# Patient Record
Sex: FEMALE | Race: WHITE | NOT HISPANIC OR LATINO | URBAN - METROPOLITAN AREA
[De-identification: names, ages, dates, MRNs, and addresses within clinical notes are randomized per-mention and may not be internally consistent; named-entity substitution may affect disease eponyms.]

---

## 2021-12-13 ENCOUNTER — INPATIENT (INPATIENT)
Facility: HOSPITAL | Age: 86
LOS: 2 days | Discharge: HOME | DRG: 69 | End: 2021-12-16
Attending: INTERNAL MEDICINE | Admitting: INTERNAL MEDICINE
Payer: MEDICARE

## 2021-12-13 VITALS
HEIGHT: 61 IN | OXYGEN SATURATION: 98 % | HEART RATE: 87 BPM | DIASTOLIC BLOOD PRESSURE: 28 MMHG | RESPIRATION RATE: 18 BRPM | TEMPERATURE: 98 F | SYSTOLIC BLOOD PRESSURE: 65 MMHG

## 2021-12-13 DIAGNOSIS — R09.89 OTHER SPECIFIED SYMPTOMS AND SIGNS INVOLVING THE CIRCULATORY AND RESPIRATORY SYSTEMS: ICD-10-CM

## 2021-12-13 DIAGNOSIS — Z98.890 OTHER SPECIFIED POSTPROCEDURAL STATES: Chronic | ICD-10-CM

## 2021-12-13 DIAGNOSIS — Z90.89 ACQUIRED ABSENCE OF OTHER ORGANS: Chronic | ICD-10-CM

## 2021-12-13 DIAGNOSIS — E87.1 HYPO-OSMOLALITY AND HYPONATREMIA: ICD-10-CM

## 2021-12-13 DIAGNOSIS — H33.051 TOTAL RETINAL DETACHMENT, RIGHT EYE: Chronic | ICD-10-CM

## 2021-12-13 DIAGNOSIS — Z95.810 PRESENCE OF AUTOMATIC (IMPLANTABLE) CARDIAC DEFIBRILLATOR: Chronic | ICD-10-CM

## 2021-12-13 LAB
ALBUMIN SERPL ELPH-MCNC: 3.7 G/DL — SIGNIFICANT CHANGE UP (ref 3.3–5)
ALP SERPL-CCNC: 66 U/L — SIGNIFICANT CHANGE UP (ref 40–120)
ALT FLD-CCNC: 27 U/L — SIGNIFICANT CHANGE UP (ref 10–45)
ANION GAP SERPL CALC-SCNC: 12 MMOL/L — SIGNIFICANT CHANGE UP (ref 5–17)
APTT BLD: 31.7 SEC — SIGNIFICANT CHANGE UP (ref 27.5–35.5)
AST SERPL-CCNC: 40 U/L — SIGNIFICANT CHANGE UP (ref 10–40)
BASOPHILS # BLD AUTO: 0.01 K/UL — SIGNIFICANT CHANGE UP (ref 0–0.2)
BASOPHILS NFR BLD AUTO: 0.1 % — SIGNIFICANT CHANGE UP (ref 0–2)
BILIRUB SERPL-MCNC: 0.6 MG/DL — SIGNIFICANT CHANGE UP (ref 0.2–1.2)
BUN SERPL-MCNC: 26 MG/DL — HIGH (ref 7–23)
CALCIUM SERPL-MCNC: 9 MG/DL — SIGNIFICANT CHANGE UP (ref 8.4–10.5)
CHLORIDE SERPL-SCNC: 92 MMOL/L — LOW (ref 96–108)
CO2 SERPL-SCNC: 25 MMOL/L — SIGNIFICANT CHANGE UP (ref 22–31)
CREAT SERPL-MCNC: 1.17 MG/DL — SIGNIFICANT CHANGE UP (ref 0.5–1.3)
EOSINOPHIL # BLD AUTO: 0.1 K/UL — SIGNIFICANT CHANGE UP (ref 0–0.5)
EOSINOPHIL NFR BLD AUTO: 1 % — SIGNIFICANT CHANGE UP (ref 0–6)
GLUCOSE SERPL-MCNC: 164 MG/DL — HIGH (ref 70–99)
HCT VFR BLD CALC: 33.9 % — LOW (ref 34.5–45)
HGB BLD-MCNC: 11.3 G/DL — LOW (ref 11.5–15.5)
IMM GRANULOCYTES NFR BLD AUTO: 0.5 % — SIGNIFICANT CHANGE UP (ref 0–1.5)
INR BLD: 2.19 RATIO — HIGH (ref 0.88–1.16)
LYMPHOCYTES # BLD AUTO: 1.6 K/UL — SIGNIFICANT CHANGE UP (ref 1–3.3)
LYMPHOCYTES # BLD AUTO: 16.4 % — SIGNIFICANT CHANGE UP (ref 13–44)
MCHC RBC-ENTMCNC: 31.6 PG — SIGNIFICANT CHANGE UP (ref 27–34)
MCHC RBC-ENTMCNC: 33.3 GM/DL — SIGNIFICANT CHANGE UP (ref 32–36)
MCV RBC AUTO: 94.7 FL — SIGNIFICANT CHANGE UP (ref 80–100)
MONOCYTES # BLD AUTO: 0.55 K/UL — SIGNIFICANT CHANGE UP (ref 0–0.9)
MONOCYTES NFR BLD AUTO: 5.6 % — SIGNIFICANT CHANGE UP (ref 2–14)
NEUTROPHILS # BLD AUTO: 7.44 K/UL — HIGH (ref 1.8–7.4)
NEUTROPHILS NFR BLD AUTO: 76.4 % — SIGNIFICANT CHANGE UP (ref 43–77)
NRBC # BLD: 0 /100 WBCS — SIGNIFICANT CHANGE UP (ref 0–0)
PLATELET # BLD AUTO: 165 K/UL — SIGNIFICANT CHANGE UP (ref 150–400)
POTASSIUM SERPL-MCNC: 3.9 MMOL/L — SIGNIFICANT CHANGE UP (ref 3.5–5.3)
POTASSIUM SERPL-SCNC: 3.9 MMOL/L — SIGNIFICANT CHANGE UP (ref 3.5–5.3)
PROT SERPL-MCNC: 6.2 G/DL — SIGNIFICANT CHANGE UP (ref 6–8.3)
PROTHROM AB SERPL-ACNC: 25.3 SEC — HIGH (ref 10.6–13.6)
RBC # BLD: 3.58 M/UL — LOW (ref 3.8–5.2)
RBC # FLD: 13.7 % — SIGNIFICANT CHANGE UP (ref 10.3–14.5)
SODIUM SERPL-SCNC: 129 MMOL/L — LOW (ref 135–145)
TROPONIN T, HIGH SENSITIVITY RESULT: 24 NG/L — SIGNIFICANT CHANGE UP (ref 0–51)
TROPONIN T, HIGH SENSITIVITY RESULT: 27 NG/L — SIGNIFICANT CHANGE UP (ref 0–51)
WBC # BLD: 9.75 K/UL — SIGNIFICANT CHANGE UP (ref 3.8–10.5)
WBC # FLD AUTO: 9.75 K/UL — SIGNIFICANT CHANGE UP (ref 3.8–10.5)

## 2021-12-13 PROCEDURE — 99223 1ST HOSP IP/OBS HIGH 75: CPT

## 2021-12-13 PROCEDURE — 70498 CT ANGIOGRAPHY NECK: CPT | Mod: 26,MA

## 2021-12-13 PROCEDURE — 0042T: CPT

## 2021-12-13 PROCEDURE — 71045 X-RAY EXAM CHEST 1 VIEW: CPT | Mod: 26

## 2021-12-13 PROCEDURE — 70496 CT ANGIOGRAPHY HEAD: CPT | Mod: 26,MA

## 2021-12-13 PROCEDURE — 99291 CRITICAL CARE FIRST HOUR: CPT | Mod: GC

## 2021-12-13 PROCEDURE — 70450 CT HEAD/BRAIN W/O DYE: CPT | Mod: 26,59,MA

## 2021-12-13 RX ORDER — SODIUM CHLORIDE 9 MG/ML
1000 INJECTION INTRAMUSCULAR; INTRAVENOUS; SUBCUTANEOUS
Refills: 0 | Status: DISCONTINUED | OUTPATIENT
Start: 2021-12-13 | End: 2021-12-15

## 2021-12-13 RX ORDER — SODIUM CHLORIDE 9 MG/ML
1000 INJECTION INTRAMUSCULAR; INTRAVENOUS; SUBCUTANEOUS ONCE
Refills: 0 | Status: COMPLETED | OUTPATIENT
Start: 2021-12-13 | End: 2021-12-13

## 2021-12-13 RX ADMIN — SODIUM CHLORIDE 1000 MILLILITER(S): 9 INJECTION INTRAMUSCULAR; INTRAVENOUS; SUBCUTANEOUS at 23:45

## 2021-12-13 RX ADMIN — SODIUM CHLORIDE 1000 MILLILITER(S): 9 INJECTION INTRAMUSCULAR; INTRAVENOUS; SUBCUTANEOUS at 22:04

## 2021-12-13 NOTE — H&P ADULT - PROBLEM SELECTOR PLAN 3
hypoNa 129 - possibly 2/2 poor PO vs 2/2 chlorthalidone effect vs SIADH  c/w IVF for now   check urine lytes, osm, serum osm   recheck BMP in AM

## 2021-12-13 NOTE — ED PROVIDER NOTE - NS ED ROS FT
Review of Systems    Constitutional: (-) fever, (-) chills, (-) fatigue  Cardiovascular: (-) chest pain, (-) syncope  Respiratory: (-) cough, (-) shortness of breath  Gastrointestinal: (-) vomiting, (-) diarrhea, (-) abdominal pain  Musculoskeletal: (-) neck pain, (-) back pain, (-) joint pain  Integumentary: (-) rash, (-) edema, (+) wound  Neurological: (-) headache, (+) altered mental status    Except as documented in the HPI, all other systems are negative.

## 2021-12-13 NOTE — H&P ADULT - NSHPPHYSICALEXAM_GEN_ALL_CORE
Vital Signs Last 24 Hrs  T(C): 36.6 (13 Dec 2021 22:05), Max: 36.6 (13 Dec 2021 20:55)  T(F): 97.8 (13 Dec 2021 22:05), Max: 97.9 (13 Dec 2021 20:55)  HR: 74 (13 Dec 2021 23:27) (74 - 89)  BP: 91/50 (13 Dec 2021 23:27) (65/28 - 100/50)  BP(mean): 62 (13 Dec 2021 23:27) (62 - 65)  RR: 14 (13 Dec 2021 23:27) (14 - 20)  SpO2: 96% (13 Dec 2021 23:27) (96% - 100%) Vital Signs Last 24 Hrs  T(C): 36.6 (13 Dec 2021 22:05), Max: 36.6 (13 Dec 2021 20:55)  T(F): 97.8 (13 Dec 2021 22:05), Max: 97.9 (13 Dec 2021 20:55)  HR: 74 (13 Dec 2021 23:27) (74 - 89)  BP: 91/50 (13 Dec 2021 23:27) (65/28 - 100/50)  BP(mean): 62 (13 Dec 2021 23:27) (62 - 65)  RR: 14 (13 Dec 2021 23:27) (14 - 20)  SpO2: 96% (13 Dec 2021 23:27) (96% - 100%)    PHYSICAL EXAM:  GENERAL: NAD, well-developed  HEAD:  laceration on top of her head; normocephalic  EYES: EOMI, conjunctiva and sclera clear  NECK: Supple, no JVD  CHEST/LUNG: Clear to auscultation bilaterally; no wheezing or rales  HEART: Regular rate and rhythm; no murmurs  ABDOMEN: Soft, nontender, nondistended; bowel sounds present  EXTREMITIES:  2+ Peripheral Pulses, no edema  PSYCH: calm affect, not anxious  NEUROLOGY:  AAOx3, 5/5 strength in all extremities, +L facial droop, EOMI, tongue midline, CN V intact,    SKIN: No rashes or lesions  MUSCULOSKELETAL: no back pain, moving all extremities

## 2021-12-13 NOTE — ED PROVIDER NOTE - NSICDXFAMILYHX_GEN_ALL_CORE_FT
FAMILY HISTORY:  Father  Still living? Unknown  Family history of leukemia, Age at diagnosis: Age Unknown    Sibling  Still living? Unknown  Family history of bladder cancer, Age at diagnosis: Age Unknown

## 2021-12-13 NOTE — ED PROVIDER NOTE - PHYSICAL EXAMINATION
CONSTITUTIONAL: non-toxic, well appearing  SKIN: no rash, no petechiae.  EYES: PERRL, EOMI, pink conjunctiva, anicteric  ENT: tongue and uvular midline, no exudates, moist mucous membranes  NECK: Supple; no meningismus, no JVD  CARD: RRR, no murmurs, equal radial pulses bilaterally 2+  RESP: CTAB, no respiratory distress  ABD: Soft, non-tender, non-distended, no peritoneal signs  EXT: Normal ROM x4. No edema. No calf tenderness  NEURO: Alert, oriented. Left sided facial droop, neuro exam otherwise nonfocal. Equal strength bilateral upper and lower extremities. Sensation grossly intact.   PSYCH: Cooperative, appropriate.

## 2021-12-13 NOTE — ED PROVIDER NOTE - NSICDXPASTSURGICALHX_GEN_ALL_CORE_FT
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

## 2021-12-13 NOTE — ED PROVIDER NOTE - NSICDXPASTMEDICALHX_GEN_ALL_CORE_FT
PAST MEDICAL HISTORY:  Afib     HLD (hyperlipidemia)     [place] (hard of hearing)     HTN (hypertension)

## 2021-12-13 NOTE — ED ADULT TRIAGE NOTE - CHIEF COMPLAINT QUOTE
as per pt's son "she had a fall while with PT at home- seen and discharged from [facility]. Was at her baseline until eating dinner at 1930 when food just started falling out of her left side of mouth and she was incoherent"  + left sided facial droop noted

## 2021-12-13 NOTE — H&P ADULT - PROBLEM SELECTOR PLAN 1
pt p/w new onset L facial droop c/f CVA. CTH brain/perfusions/CTA H+N all without e/o of infract or bleed. On exam, still with facial asymmetry, rest of exam is non focal, strength otherwise appears intact. ?ischemia in setting of hypotension vs embolic in setting of known Afib vs ?metabolic/infectious in nature.  - neuro following, recs appreciated  - keep SBP up to 160   - repeat 24hr CT head; will need further information regarding cardiac device before proceeding with MRI   - can c/w home dose xarelto   - monitor on tele   - check lipid, A1c, TSH   - c/w IVF for hypoNa and holding chlorthalidone - repeat BMP in AM   - pt with no fevers, normal WBC, no infectious symptoms - CXR showing ?RLL opacity, low suspicion for PNA given no resp symptoms; awaiting UA/COVID - defer abx for now   - neuro checks q4h   - PT/OT, dysphagia screen

## 2021-12-13 NOTE — H&P ADULT - PROBLEM SELECTOR PLAN 4
currently in rate controlled Afib  holding coreg 2/2 hypotension   per neuro, can c/w xarelto for AC   monitor on tele

## 2021-12-13 NOTE — CONSULT NOTE ADULT - ASSESSMENT
Patient is a 89yo Rt handed F with pmh of Afib on xarelto, AICD, HTn, HLD presents to [facility] for Left droop and generalized weakness. LWK 1930 pm when patient was having dinner with family and noticed facial droop with drooling and generalized weakness. LWK 1930 pm with Left facial droop. Patient not a candidate for TPA nor MT candidate. Patient was reexamined after CT scanner and per son, facial droop improved. Patient was noted to be hypotensive with SBPs in the 60s.       Impression  Left facial droop with generalized weakness now improving. 2/2 to Right brain dysfunction. Etiology ischemic. . Patient noted to be hypotensive of unknown etiology with episodes of feeling weak.   Recommendation:   Keep SBP up to 160   Repeat CT scan for any acute neuro deficits given recent head trauma, other repeat Ct head in 24hrs   Can continue Xarelto home medication as Ct head was negative for bleed.   Telemetry   EKG   infectious panel   Hyponatremia management per primary team   U/A   Brain MRI w/o contrast   Patient can follow up with Dr. Nissenbaum upon discharge at [address], [address]. [place]. [address]. [phone].       Case discussed with Stroke Fellow, Dr. Lopez. Case to be seen with Dr. Cruz.      Patient is a 89yo Rt handed F with pmh of Afib on xarelto, AICD, HTn, HLD presents to [facility] for Left droop and generalized weakness. LWK 1930 pm when patient was having dinner with family and noticed facial droop with drooling and generalized weakness. LWK 1930 pm with Left facial droop. Patient not a candidate for TPA nor MT candidate. Patient was reexamined after CT scanner and per son, facial droop improved. Patient was noted to be hypotensive with SBPs in the 60s.       Impression  Left facial droop with generalized weakness now improving. 2/2 to Right brain dysfunction. Etiology ischemic. . Patient noted to be hypotensive of unknown etiology with episodes of feeling weak.   Recommendation:   Keep SBP up to 160   Repeat CT scan for any acute neuro deficits given recent head trauma, other repeat Ct head in 24hrs   Can continue Xarelto home medication as Ct head was negative for bleed.   Telemetry   Lipid panel, tsh, hgb a1c   EKG   infectious panel   Hyponatremia management per primary team   urine electrolytes   U/A   Brain MRI w/o contrast   Patient can follow up with Dr. Nissenbaum upon discharge at [address], [address]. [place]. [address]. [phone].       Case discussed with Stroke Fellow, Dr. Lopez. Case to be seen with Dr. Cruz.

## 2021-12-13 NOTE — H&P ADULT - NSHPREVIEWOFSYSTEMS_GEN_ALL_CORE
REVIEW OF SYSTEMS:    CONSTITUTIONAL: No fevers, chills  EYES/ENT: No visual changes;  no throat pain   NECK: No pain or stiffness  RESPIRATORY: No cough, no shortness of breath  CARDIOVASCULAR: No chest pain or palpitations  GASTROINTESTINAL: no nausea, vomiting, no abdominal pain, no BRBPR  GENITOURINARY: no polyuria, no dysuria  NEUROLOGICAL: no numbness, no headaches  MUSCULOSKELETAL: no back pain, no focal weakness   SKIN: No itching, burning, rashes, or lesions; +head laceration   PSYCH: no anxiety, depression  HEME: no gum bleeding, no bruising

## 2021-12-13 NOTE — H&P ADULT - NSICDXPASTMEDICALHX_GEN_ALL_CORE_FT
PAST MEDICAL HISTORY:  Afib     HLD (hyperlipidemia)     [place] (hard of hearing)     HTN (hypertension)

## 2021-12-13 NOTE — H&P ADULT - ASSESSMENT
89F with PMH of Afib on xarelto, HTN, HLD, s/p AICD, [place] p/w L facial droop after mechanical fall earlier; a/f stroke work-up, hyponatremia.

## 2021-12-13 NOTE — ED PROVIDER NOTE - CLINICAL SUMMARY MEDICAL DECISION MAKING FREE TEXT BOX
Kathryn-PGY3: 89 year old female with PMH atrial fibrillation on Xarelto, HTN, HLD presents with left sided facial weakness since 1930 today. Per son, pt had mechanical fall this afternoon where she tripped and hit her head, denies LOC. Pt seen at [facility] after wall with CT showing no intracranial hemorrhage per son, had scalp laceration stapled, and discharged. Per son, pt was sitting at dinner table and started speaking incoherently with left sided facial droop. Pt code stroke in triage, also noted to be hypotensive. Pt BP typically low per son. Will obtain labs, imaging, neuro recs, anticipate likely admission for further evaluation and management.

## 2021-12-13 NOTE — ED PROVIDER NOTE - OBJECTIVE STATEMENT
89 year old female with PMH atrial fibrillation on Xarelto, HTN, HLD presents with left sided facial weakness since 1930 today. Per son, pt had mechanical fall this afternoon where she tripped and hit her head, denies LOC. Pt seen at [facility] after wall with CT showing no intracranial hemorrhage per son, had scalp laceration stapled, and discharged. Per son, pt was sitting at dinner table    TO BE COMPLETED 89 year old female with PMH atrial fibrillation on Xarelto, HTN, HLD presents with left sided facial weakness since 1930 today. Per son, pt had mechanical fall this afternoon where she tripped and hit her head, denies LOC. Pt seen at [facility] after wall with CT showing no intracranial hemorrhage per son, had scalp laceration stapled, and discharged. Per son, pt was sitting at dinner table and started speaking incoherently with left sided facial droop. Denies any fevers, chest pain, shortness of breath, abdominal pain, vomiting, diarrhea, dysuria, headache, vision change, numbness, or rash. Denies any additional complaints.

## 2021-12-13 NOTE — ED ADULT NURSE NOTE - NSIMPLEMENTINTERV_GEN_ALL_ED
Implemented All Fall with Harm Risk Interventions:  [place] to call system. Call bell, personal items and telephone within reach. Instruct patient to call for assistance. Room bathroom lighting operational. Non-slip footwear when patient is off stretcher. Physically safe environment: no spills, clutter or unnecessary equipment. Stretcher in lowest position, wheels locked, appropriate side rails in place. Provide visual cue, wrist band, yellow gown, etc. Monitor gait and stability. Monitor for mental status changes and reorient to person, place, and time. Review medications for side effects contributing to fall risk. Reinforce activity limits and safety measures with patient and family. Provide visual clues: red socks.

## 2021-12-13 NOTE — H&P ADULT - HISTORY OF PRESENT ILLNESS
89F with PMH of Afib on xarelto, HTN, HLD, s/p AICD, [place] p/w L facial droop. History from patient and charts. Per pt, she had a fall earlier today - she was working with PT earlier when she lost her balance and fell backward and hit her head on the wall; denies any LOC but endorses significant bleeding from a head laceration. She denies any prodromal lightheadedness, dizziness, CP, SOB, palpitations. She states she has balance issues at baseline with frequent falls, last fall 2 weeks ago. EMS was called and pt taken to [facility], where she had CT head showing no bleed and had her laceration stapled and sent home. Pt states she was feeling well until right after dinner, when she started feeling dizzy and has poor recollection of events thereafter.   Called and unable to reach son Patrick Wheeler at listed number - further history from charts. Per charts, son started noticing L facial droop around 1930 this evening, with assocated drooling and noted food was dropping out of her mouth and incoherent speech. Pt did not lose consciousness and son did not note any seizure like activity. Brought to the ED as code stroke. Pt herself denies any headache, blurry vision, change in speech pattern, difficulty swallowing, no focal weakness, no numbness/tingling.  Denies any infectious symptoms including fevers, chills, cough, SOB, n/v, abd pain, diarrhea, dysuria or polyuria.     In ED, noted ot be hypotensive on arrival; had CTH imaging showing no acute bleed or infarcts. Per neuro notes, son states L facial droop is improving.

## 2021-12-13 NOTE — ED PROVIDER NOTE - ATTENDING CONTRIBUTION TO CARE
Evaluating for critical conditions noted above. Ordering tests. Reviewing results. Ordering medications as noted in MAR. Discussions with consultant.   Code stroke cancelled after determining that pt had been on xarelto and INR high -> ineligible for TPA.    Dr. TRICIA Wong:  I have personally evaluated the patient and have documented above as appropriate. I perfomed a substantive portion of the visit including all aspects of the medical decision making.

## 2021-12-13 NOTE — CONSULT NOTE ADULT - ATTENDING COMMENTS
code stroke called in ED and neuro emergently assessed patient.   Briefly   89yo Rt handed F with  Afib on xarelto, AICD, HTn, HLD presents to [facility] for facial droop and generalized weakness. LWK 1930 pm when patient was having dinner with family and noticed facial droop with drooling and generalized weakness. LWK 1930 pm with Left facial droop. Patient not a candidate for TPA nor MT candidate. Patient was reexamined after CT scanner and per son, facial droop improved. Patient was noted to be hypotensive with SBPs in the 60s.   CTH chronic L BG lacunar infarct   CTA with question of FMD vs short segment stenosis     Impression  R facial droop with generalized weakness now improving. 2/2 to L brain dysfunction from old stroke . Etiology ischemic, . Patient noted to be hypotensive 60/30 in ED. now improved     Recommendation:   - c/w DOAC for AF  - statin therpay for secondary stroke prevention. LDL goal <70mg/dL.  - Hemoglobin A1c and lipid panel  - TTE not needed. AICD interrogation   - telemetry  - BP normotensive   - check FS, glucose control <180  - GI/DVT ppx  - Counseling on diet, exercise, and medication adherence was done  - Counseling on smoking cessation and alcohol consumption offered when appropriate.  - Pain assessed and judicious use of narcotics when appropriate was discussed.    - Stroke education given when appropriate.  - Importance of fall prevention discussed.   - Differential diagnosis and plan of care discussed with patient and/or family and primary team  - Thank you for allowing me to participate in the care of this patient. Call with questions.

## 2021-12-13 NOTE — ED ADULT NURSE NOTE - OBJECTIVE STATEMENT
89 y/o female presenting to ED ambulatory, A&Ox3, presenting with  dizziness and facial drooping. As per patient family, primary historian, pt had a witnessed mechanical fall while working with PT,  hitting head at 1130 and was sent to [facility] for workup. Pt received CT scan negative for bleed and received sutures for head laceration. Pt now complains of dizziness and left side facial drooping. as per pt son, pt was eating dinner and facial drooping started, food was coming out of patients mouth. pt denies loss of consciousness, headache, changes in vision, shortness of breath, chest pain, fevers, chills, cough, n/v/d, palpitations. 89 y/o female presenting to ED ambulatory, A&Ox3, presenting with  dizziness and facial drooping. As per patient family, primary historian, pt had a witnessed mechanical fall while working with PT,  hitting head at 1130 and was sent to [facility] for workup. Pt received CT scan negative for bleed and received sutures for head laceration. Pt now complains of dizziness and left side facial drooping. pt on anticoagulation. as per pt son, pt was eating dinner and facial drooping started, food was coming out of patients mouth. pt denies loss of consciousness, headache, changes in vision, shortness of breath, chest pain, fevers, chills, cough, n/v/d, palpitations, diaphoresis, urinary symptoms. 2mm pupils PERRL. extremities equal in strength and sensation. hard of hearing at baseline. left side facial drooping present. no drift in extremities. steady gait observed. bleeding controlled, head staples present on top of head. breathing spontanous and unlabored. abd soft nontender. pt placed on cardiac monitor showing NSR with pacemaker. code stroke called. Safety and comfort measures provided, bed locked and in lowest position, side rails up for safety. Call bell within reach. Awaiting results

## 2021-12-13 NOTE — H&P ADULT - NSHPLABSRESULTS_GEN_ALL_CORE
Labs, imaging and EKG personally reviewed and interpreted by me.                           11.3   9.75  )-----------( 165      ( 13 Dec 2021 21:14 )             33.9     12-13    129<L>  |  92<L>  |  26<H>  ----------------------------<  164<H>  3.9   |  25  |  1.17    Ca    9.0      13 Dec 2021 21:14    TPro  6.2  /  Alb  3.7  /  TBili  0.6  /  DBili  x   /  AST  40  /  ALT  27  /  AlkPhos  66  12-13        PT/INR - ( 13 Dec 2021 21:14 )   PT: 25.3 sec;   INR: 2.19 ratio         PTT - ( 13 Dec 2021 21:14 )  PTT:31.7 sec      < from: CT Brain Stroke Protocol (12.13.21 @ 21:22) >      IMPRESSION:    No acute intracranial hemorrhage, mass effect, orevidence of acute   vascular territorial infarction. If clinical symptoms persist or worsen,   more sensitive evaluation with brain MRI may be obtained, if no   contraindications exist.    < end of copied text > Labs, imaging and EKG personally reviewed and interpreted by me.   labs notable for normal WBC, HB; hypoNa 129, Cr 1.16 (unknown baseline), normal LFTs, delta trop (-).  Imaging personally reviewed and interpreted by me - CXR: RLL patchy opacity, no effusions   EKG personally reviewed and interpreted by me - Aflutter with variable block, ?pacing, iRBBB, no acute KAMI,                           11.3   9.75  )-----------( 165      ( 13 Dec 2021 21:14 )             33.9     12-13    129<L>  |  92<L>  |  26<H>  ----------------------------<  164<H>  3.9   |  25  |  1.17    Ca    9.0      13 Dec 2021 21:14    TPro  6.2  /  Alb  3.7  /  TBili  0.6  /  DBili  x   /  AST  40  /  ALT  27  /  AlkPhos  66  12-13        PT/INR - ( 13 Dec 2021 21:14 )   PT: 25.3 sec;   INR: 2.19 ratio         PTT - ( 13 Dec 2021 21:14 )  PTT:31.7 sec      < from: CT Brain Stroke Protocol (12.13.21 @ 21:22) >      IMPRESSION:    No acute intracranial hemorrhage, mass effect, orevidence of acute   vascular territorial infarction. If clinical symptoms persist or worsen,   more sensitive evaluation with brain MRI may be obtained, if no   contraindications exist.    < end of copied text >

## 2021-12-13 NOTE — CONSULT NOTE ADULT - SUBJECTIVE AND OBJECTIVE BOX
MRN-[id number]  Patient is a 89y old  Female who presents with a chief complaint of   HPI:  Patient is a 89yo Rt handed F with pmh of Afib on xarelto, AICD, HTn, HLD presents to [facility] for Left droop and generalized weakness. LWK 1930 pm when patient was having dinner with family and noticed facial droop with drooling and generalized weakness. Code stroke was called 2058pm. Patient was noted to have mild Left facial droop. Per history from family, patient was noted to have head trauma with lacerations and staples. Earlier today, Physical therapy arrived at the house and when patient saw Physical therapist, patient stumbled back and hit head against the wall. There was no LOC noted. Patient is followed by her cardiologist Dr. Uriostegui and Neurologist Dr. Nissenbaum for ataxia. Patient's son states patient has been feeling weak lately. Patient had NIHSS 1 and MRS 4. Patient not TPA candidate due to recent head trauma with low NIHSS scale. Patient not MT candidate due to no LVO.     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      FAMILY HISTORY:  Family history of leukemia (Father)    Family history of bladder cancer (Sibling)      Social Hx:  Nonsmoker, no drug or alcohol use    Home Medications:  amiodarone 200 mg oral tablet: 1 tab(s) orally once a day (27 Dec 2016 15:15)  calcium carbonate:  orally once a day (27 Dec 2016 15:15)  carvedilol 25 mg oral tablet: 1 tab(s) orally 2 times a day (27 Dec 2016 15:15)  Centrum Silver Women&#x27;s: 1 tab(s) orally once a day (27 Dec 2016 15:15)  folic acid 1 mg oral tablet: 1 tab(s) orally once a day (27 Dec 2016 15:15)  Lasix 20 mg oral tablet: 1 tab(s) orally 2 times a day (27 Dec 2016 15:15)  pravastatin 40 mg oral tablet: 1 tab(s) orally once a day (27 Dec 2016 15:15)  Protegra oral tablet: 1 tab(s) orally once a day (27 Dec 2016 15:15)  Vitamin B6 100 mg oral tablet: 1 tab(s) orally once a day (27 Dec 2016 15:15)  Xarelto 15 mg oral tablet: 1 tab(s) orally once a day (in the evening) (27 Dec 2016 15:15)    MEDICATIONS  (STANDING):  sodium chloride 0.9% Bolus 1000 milliLiter(s) IV Bolus once    MEDICATIONS  (PRN):    Allergies  No Known Allergies    Intolerances      REVIEW OF SYSTEMS  General: Feels weak 		  Respiratory and Thorax:	Denies sob   Cardiovascular:	Denies chest pain   Gastrointestinal:	Denies N, V   Genitourinary:	Denies urinary incontinence   Musculoskeletal:	Denies muscle pain   Neurological: Denies headaches, numbness and tinglng.   	    ROS: Pertinent positives in HPI, all other ROS were reviewed and are negative.      Vital Signs Last 24 Hrs  T(C): 36.6 (13 Dec 2021 20:55), Max: 36.6 (13 Dec 2021 20:55)  T(F): 97.9 (13 Dec 2021 20:55), Max: 97.9 (13 Dec 2021 20:55)  HR: 77 (13 Dec 2021 21:32) (77 - 89)  BP: 100/50 (13 Dec 2021 21:32) (65/28 - 100/50)  BP(mean): 65 (13 Dec 2021 21:32) (65 - 65)  RR: 14 (13 Dec 2021 21:32) (14 - 20)  SpO2: 100% (13 Dec 2021 21:32) (98% - 100%)    GENERAL EXAM:  Constitutional: awake and alert. NAD  HEENT: PERRL, EOMI  Neck: Supple  Vascular: no carotid bruits  Musculoskeletal: no joint swelling/tenderness, no abnormal movements  Skin: no rashes    NEUROLOGICAL EXAM:  MS: AAOX3, speech is fluent, Follows commands. There is no aphasia noted.   CN: VFF, EOMI, PERRL,   V1-3 intact, very mild left facial palsy, t/p midline  Motor: Strength: 5/5 4x.   Tone: normal. Bulk: normal.   Plantar flex b/l.   Sensation: intact to LT/Temperature 4x.   Coordination: FTN intact b/l.   Gait:  Deferred     NIHSS 1  mRS 4    Labs:   cbc                      11.3   9.75  )-----------( 165      ( 13 Dec 2021 21:14 )             33.9     [id number]    129<L>  |  92<L>  |  26<H>  ----------------------------<  164<H>  3.9   |  25  |  1.17    Ca    9.0      13 Dec 2021 21:14    TPro  6.2  /  Alb  3.7  /  TBili  0.6  /  DBili  x   /  AST  40  /  ALT  27  /  AlkPhos  66  12-13    CoagsPT/INR - ( 13 Dec 2021 21:14 )   PT: 25.3 sec;   INR: 2.19 ratio         PTT - ( 13 Dec 2021 21:14 )  PTT:31.7 sec    LFTsLIVER FUNCTIONS - ( 13 Dec 2021 21:14 )  Alb: 3.7 g/dL / Pro: 6.2 g/dL / ALK PHOS: 66 U/L / ALT: 27 U/L / AST: 40 U/L / GGT: x             Radiology:  IMPRESSION:  No acute intracranial hemorrhage, mass effect, orevidence of acute   vascular territorial infarction. If clinical symptoms persist or worsen,   more sensitive evaluation with brain MRI may be obtained, if no   contraindications exist.    CTA Neck: No significant flow-limiting stenosis or evidence of acute   dissection within the cervical carotid or vertebral arteries. No specific   short segment beaded appearance to the mid left cervical internal carotid   artery for which focal fibromuscular dysplasia is not excluded.    Partially imaged 2.6 cm right paratracheal peripherally calcified lesion.   Dedicated nonemergent chest imaging may be obtained asclinically   warranted.    CTA Head: No proximal large vessel occlusion.    CT Perfusion: No perfusion abnormality.

## 2021-12-13 NOTE — H&P ADULT - PROBLEM SELECTOR PLAN 2
pt hypotensive on arrival 60/30 --> now improved to 90/50s  pt on multiple antiHTN at home including coreg, chlorthalidone, ramipril, ?lasix   c/w IVF overnight   holding all home antiHTN  q4h vitals for now

## 2021-12-13 NOTE — ED ADULT NURSE NOTE - CHIEF COMPLAINT QUOTE
as per pt's son "she had a fall while with PT at home- seen and discharged from [facility]. Was at her baseline until eating dinner at 1930 when food just started falling out of her left side of mouth and she was incoherent"  + left sided facial droop noted

## 2021-12-14 DIAGNOSIS — E87.1 HYPO-OSMOLALITY AND HYPONATREMIA: ICD-10-CM

## 2021-12-14 DIAGNOSIS — I95.9 HYPOTENSION, UNSPECIFIED: ICD-10-CM

## 2021-12-14 DIAGNOSIS — Z29.9 ENCOUNTER FOR PROPHYLACTIC MEASURES, UNSPECIFIED: ICD-10-CM

## 2021-12-14 DIAGNOSIS — I63.9 CEREBRAL INFARCTION, UNSPECIFIED: ICD-10-CM

## 2021-12-14 DIAGNOSIS — I48.0 PAROXYSMAL ATRIAL FIBRILLATION: ICD-10-CM

## 2021-12-14 LAB
ANION GAP SERPL CALC-SCNC: 11 MMOL/L — SIGNIFICANT CHANGE UP (ref 5–17)
APPEARANCE UR: CLEAR — SIGNIFICANT CHANGE UP
BACTERIA # UR AUTO: NEGATIVE — SIGNIFICANT CHANGE UP
BASOPHILS # BLD AUTO: 0 K/UL — SIGNIFICANT CHANGE UP (ref 0–0.2)
BASOPHILS NFR BLD AUTO: 0 % — SIGNIFICANT CHANGE UP (ref 0–2)
BILIRUB UR-MCNC: NEGATIVE — SIGNIFICANT CHANGE UP
BUN SERPL-MCNC: 20 MG/DL — SIGNIFICANT CHANGE UP (ref 7–23)
CALCIUM SERPL-MCNC: 8.9 MG/DL — SIGNIFICANT CHANGE UP (ref 8.4–10.5)
CHLORIDE SERPL-SCNC: 100 MMOL/L — SIGNIFICANT CHANGE UP (ref 96–108)
CHOLEST SERPL-MCNC: 117 MG/DL — SIGNIFICANT CHANGE UP
CO2 SERPL-SCNC: 25 MMOL/L — SIGNIFICANT CHANGE UP (ref 22–31)
COLOR SPEC: YELLOW — SIGNIFICANT CHANGE UP
CREAT ?TM UR-MCNC: 30 MG/DL — SIGNIFICANT CHANGE UP
CREAT SERPL-MCNC: 1.01 MG/DL — SIGNIFICANT CHANGE UP (ref 0.5–1.3)
DIFF PNL FLD: NEGATIVE — SIGNIFICANT CHANGE UP
EOSINOPHIL # BLD AUTO: 0.08 K/UL — SIGNIFICANT CHANGE UP (ref 0–0.5)
EOSINOPHIL NFR BLD AUTO: 1.4 % — SIGNIFICANT CHANGE UP (ref 0–6)
EPI CELLS # UR: 0 /HPF — SIGNIFICANT CHANGE UP
GLUCOSE SERPL-MCNC: 91 MG/DL — SIGNIFICANT CHANGE UP (ref 70–99)
GLUCOSE UR QL: NEGATIVE — SIGNIFICANT CHANGE UP
HCT VFR BLD CALC: 30.3 % — LOW (ref 34.5–45)
HDLC SERPL-MCNC: 66 MG/DL — SIGNIFICANT CHANGE UP
HGB BLD-MCNC: 10 G/DL — LOW (ref 11.5–15.5)
IMM GRANULOCYTES NFR BLD AUTO: 0.2 % — SIGNIFICANT CHANGE UP (ref 0–1.5)
KETONES UR-MCNC: NEGATIVE — SIGNIFICANT CHANGE UP
LEUKOCYTE ESTERASE UR-ACNC: NEGATIVE — SIGNIFICANT CHANGE UP
LIPID PNL WITH DIRECT LDL SERPL: 40 MG/DL — SIGNIFICANT CHANGE UP
LYMPHOCYTES # BLD AUTO: 1.58 K/UL — SIGNIFICANT CHANGE UP (ref 1–3.3)
LYMPHOCYTES # BLD AUTO: 27.2 % — SIGNIFICANT CHANGE UP (ref 13–44)
MAGNESIUM SERPL-MCNC: 2 MG/DL — SIGNIFICANT CHANGE UP (ref 1.6–2.6)
MCHC RBC-ENTMCNC: 31.3 PG — SIGNIFICANT CHANGE UP (ref 27–34)
MCHC RBC-ENTMCNC: 33 GM/DL — SIGNIFICANT CHANGE UP (ref 32–36)
MCV RBC AUTO: 95 FL — SIGNIFICANT CHANGE UP (ref 80–100)
MONOCYTES # BLD AUTO: 0.57 K/UL — SIGNIFICANT CHANGE UP (ref 0–0.9)
MONOCYTES NFR BLD AUTO: 9.8 % — SIGNIFICANT CHANGE UP (ref 2–14)
NEUTROPHILS # BLD AUTO: 3.57 K/UL — SIGNIFICANT CHANGE UP (ref 1.8–7.4)
NEUTROPHILS NFR BLD AUTO: 61.4 % — SIGNIFICANT CHANGE UP (ref 43–77)
NITRITE UR-MCNC: NEGATIVE — SIGNIFICANT CHANGE UP
NON HDL CHOLESTEROL: 51 MG/DL — SIGNIFICANT CHANGE UP
NRBC # BLD: 0 /100 WBCS — SIGNIFICANT CHANGE UP (ref 0–0)
OSMOLALITY SERPL: 285 MOSMOL/KG — SIGNIFICANT CHANGE UP (ref 280–301)
OSMOLALITY UR: 372 MOS/KG — SIGNIFICANT CHANGE UP (ref 300–900)
PH UR: 7 — SIGNIFICANT CHANGE UP (ref 5–8)
PHOSPHATE SERPL-MCNC: 3.1 MG/DL — SIGNIFICANT CHANGE UP (ref 2.5–4.5)
PLATELET # BLD AUTO: 144 K/UL — LOW (ref 150–400)
POTASSIUM SERPL-MCNC: 3.3 MMOL/L — LOW (ref 3.5–5.3)
POTASSIUM SERPL-SCNC: 3.3 MMOL/L — LOW (ref 3.5–5.3)
PROT UR-MCNC: SIGNIFICANT CHANGE UP
RBC # BLD: 3.19 M/UL — LOW (ref 3.8–5.2)
RBC # FLD: 13.9 % — SIGNIFICANT CHANGE UP (ref 10.3–14.5)
RBC CASTS # UR COMP ASSIST: 0 /HPF — SIGNIFICANT CHANGE UP (ref 0–4)
SARS-COV-2 RNA SPEC QL NAA+PROBE: SIGNIFICANT CHANGE UP
SODIUM SERPL-SCNC: 136 MMOL/L — SIGNIFICANT CHANGE UP (ref 135–145)
SODIUM UR-SCNC: 44 MMOL/L — SIGNIFICANT CHANGE UP
SP GR SPEC: 1.05 — HIGH (ref 1.01–1.02)
TRIGL SERPL-MCNC: 54 MG/DL — SIGNIFICANT CHANGE UP
TSH SERPL-MCNC: 2.11 UIU/ML — SIGNIFICANT CHANGE UP (ref 0.27–4.2)
UROBILINOGEN FLD QL: NEGATIVE — SIGNIFICANT CHANGE UP
WBC # BLD: 5.81 K/UL — SIGNIFICANT CHANGE UP (ref 3.8–10.5)
WBC # FLD AUTO: 5.81 K/UL — SIGNIFICANT CHANGE UP (ref 3.8–10.5)
WBC UR QL: 1 /HPF — SIGNIFICANT CHANGE UP (ref 0–5)

## 2021-12-14 PROCEDURE — 70450 CT HEAD/BRAIN W/O DYE: CPT | Mod: 26

## 2021-12-14 PROCEDURE — 93010 ELECTROCARDIOGRAM REPORT: CPT

## 2021-12-14 RX ORDER — RIVAROXABAN 15 MG-20MG
15 KIT ORAL
Refills: 0 | Status: DISCONTINUED | OUTPATIENT
Start: 2021-12-14 | End: 2021-12-16

## 2021-12-14 RX ORDER — FOLIC ACID 0.8 MG
1 TABLET ORAL DAILY
Refills: 0 | Status: DISCONTINUED | OUTPATIENT
Start: 2021-12-14 | End: 2021-12-16

## 2021-12-14 RX ORDER — ACETAMINOPHEN 500 MG
650 TABLET ORAL EVERY 6 HOURS
Refills: 0 | Status: DISCONTINUED | OUTPATIENT
Start: 2021-12-14 | End: 2021-12-16

## 2021-12-14 RX ORDER — ONDANSETRON 8 MG/1
4 TABLET, FILM COATED ORAL EVERY 8 HOURS
Refills: 0 | Status: DISCONTINUED | OUTPATIENT
Start: 2021-12-14 | End: 2021-12-14

## 2021-12-14 RX ORDER — AMIODARONE HYDROCHLORIDE 400 MG/1
200 TABLET ORAL DAILY
Refills: 0 | Status: DISCONTINUED | OUTPATIENT
Start: 2021-12-14 | End: 2021-12-16

## 2021-12-14 RX ORDER — ATORVASTATIN CALCIUM 80 MG/1
10 TABLET, FILM COATED ORAL AT BEDTIME
Refills: 0 | Status: DISCONTINUED | OUTPATIENT
Start: 2021-12-14 | End: 2021-12-16

## 2021-12-14 RX ORDER — LANOLIN ALCOHOL/MO/W.PET/CERES
3 CREAM (GRAM) TOPICAL AT BEDTIME
Refills: 0 | Status: DISCONTINUED | OUTPATIENT
Start: 2021-12-14 | End: 2021-12-16

## 2021-12-14 RX ADMIN — ATORVASTATIN CALCIUM 10 MILLIGRAM(S): 80 TABLET, FILM COATED ORAL at 21:40

## 2021-12-14 RX ADMIN — Medication 1 MILLIGRAM(S): at 17:56

## 2021-12-14 RX ADMIN — SODIUM CHLORIDE 100 MILLILITER(S): 9 INJECTION INTRAMUSCULAR; INTRAVENOUS; SUBCUTANEOUS at 01:39

## 2021-12-14 RX ADMIN — AMIODARONE HYDROCHLORIDE 200 MILLIGRAM(S): 400 TABLET ORAL at 05:39

## 2021-12-14 RX ADMIN — SODIUM CHLORIDE 60 MILLILITER(S): 9 INJECTION INTRAMUSCULAR; INTRAVENOUS; SUBCUTANEOUS at 13:18

## 2021-12-14 RX ADMIN — RIVAROXABAN 15 MILLIGRAM(S): KIT at 17:56

## 2021-12-14 NOTE — PROGRESS NOTE ADULT - PROBLEM SELECTOR PLAN 1
pt p/w new onset L facial droop c/f CVA. CTH brain/perfusions/CTA H+N all without e/o of infract or bleed. On exam, still with facial asymmetry, rest of exam is non focal, strength otherwise appears intact. ?ischemia in setting of hypotension vs embolic in setting of known Afib vs ?metabolic/infectious in nature.  - neuro following, recs appreciated  - keep SBP up to 160   - repeat 24hr CT head; will need further information regarding cardiac device before proceeding with MRI   - can c/w home dose xarelto   - monitor on tele   - check lipid, A1c, TSH   - c/w IVF for hypoNa and holding chlorthalidone - repeat BMP in AM   - pt with no fevers, normal WBC, no infectious symptoms - CXR showing ?RLL opacity, low suspicion for PNA given no resp symptoms; awaiting UA/COVID - defer abx for now   - neuro checks q4h   - PT/OT, dysphagia screen pt p/w new onset L facial droop c/f CVA. CTH brain/perfusions/CTA H+N all without e/o of infract or bleed. On exam, still with facial asymmetry, rest of exam is non focal, strength otherwise appears intact. ?ischemia in setting of hypotension vs embolic in setting of known Afib vs ?metabolic/infectious in nature.     - repeat 24hr CT head; will need further information regarding cardiac device before proceeding with MRI   - can c/w home dose xarelto 15 mg/day  - monitor on tele   - check lipid, A1c, TSH   - c/w IVF for hypoNa and holding chlorthalidone - repeat BMP in AM   - pt with no fevers, normal WBC, no infectious symptoms - CXR showing ?RLL opacity, low suspicion for PNA given no resp symptoms; awaiting UA/COVID - defer abx for now   - PT eval, Swallow cleared for diet today.

## 2021-12-14 NOTE — PHYSICAL THERAPY INITIAL EVALUATION ADULT - DISCHARGE DISPOSITION, PT EVAL
subacute. If pt goes home, recommend home PT and assist for functional activities/rehabilitation facility

## 2021-12-14 NOTE — PROGRESS NOTE ADULT - SUBJECTIVE AND OBJECTIVE BOX
INTERVAL HPI/OVERNIGHT EVENTS:  Pt seen and examined at bedside.     Allergies/Intolerance: No Known Allergies      MEDICATIONS  (STANDING):  aMIOdarone    Tablet 200 milliGRAM(s) Oral daily  atorvastatin 10 milliGRAM(s) Oral at bedtime  folic acid 1 milliGRAM(s) Oral daily  rivaroxaban 15 milliGRAM(s) Oral with dinner  sodium chloride 0.9%. 1000 milliLiter(s) (100 mL/Hr) IV Continuous <Continuous>    MEDICATIONS  (PRN):  acetaminophen     Tablet .. 650 milliGRAM(s) Oral every 6 hours PRN Temp greater or equal to 38C (100.4F), Mild Pain (1 - 3)  aluminum hydroxide/magnesium hydroxide/simethicone Suspension 30 milliLiter(s) Oral every 4 hours PRN Dyspepsia  melatonin 3 milliGRAM(s) Oral at bedtime PRN Insomnia  ondansetron Injectable 4 milliGRAM(s) IV Push every 8 hours PRN Nausea and/or Vomiting        ROS: all systems reviewed and wnl      PHYSICAL EXAMINATION:  Vital Signs Last 24 Hrs  T(C): 36.3 (14 Dec 2021 07:40), Max: 36.7 (14 Dec 2021 02:11)  T(F): 97.4 (14 Dec 2021 07:40), Max: 98.1 (14 Dec 2021 02:11)  HR: 80 (14 Dec 2021 07:40) (72 - 92)  BP: 105/66 (14 Dec 2021 07:40) (65/28 - 107/76)  BP(mean): 65 (14 Dec 2021 00:09) (62 - 65)  RR: 16 (14 Dec 2021 07:40) (13 - 20)  SpO2: 98% (14 Dec 2021 07:40) (94% - 100%)  CAPILLARY BLOOD GLUCOSE  165 (13 Dec 2021 21:09)      POCT Blood Glucose.: 165 mg/dL (13 Dec 2021 20:59)        GENERAL:   NECK: supple, No JVD  CHEST/LUNG: clear to auscultation bilaterally; no rales, rhonchi, or wheezing b/l  HEART: normal S1, S2  ABDOMEN: BS+, soft, ND, NT   EXTREMITIES:  pulses palpable; no clubbing, cyanosis, or edema b/l LEs  SKIN: no rashes or lesions      LABS:                        10.0   5.81  )-----------( 144      ( 14 Dec 2021 05:43 )             30.3     12-14    136  |  100  |  20  ----------------------------<  91  3.3<L>   |  25  |  1.01    Ca    8.9      14 Dec 2021 05:42  Phos  3.1     12-  Mg     2.0     -    TPro  6.2  /  Alb  3.7  /  TBili  0.6  /  DBili  x   /  AST  40  /  ALT  27  /  AlkPhos  66  12-13    PT/INR - ( 13 Dec 2021 21:14 )   PT: 25.3 sec;   INR: 2.19 ratio         PTT - ( 13 Dec 2021 21:14 )  PTT:31.7 sec  Urinalysis Basic - ( 14 Dec 2021 03:46 )    Color: Yellow / Appearance: Clear / S.046 / pH: x  Gluc: x / Ketone: Negative  / Bili: Negative / Urobili: Negative   Blood: x / Protein: Trace / Nitrite: Negative   Leuk Esterase: Negative / RBC: 0 /hpf / WBC 1 /HPF   Sq Epi: x / Non Sq Epi: 0 /hpf / Bacteria: Negative             INTERVAL HPI/OVERNIGHT EVENTS:  Pt seen and examined at bedside.     Allergies/Intolerance: No Known Allergies      MEDICATIONS  (STANDING):  aMIOdarone    Tablet 200 milliGRAM(s) Oral daily  atorvastatin 10 milliGRAM(s) Oral at bedtime  folic acid 1 milliGRAM(s) Oral daily  rivaroxaban 15 milliGRAM(s) Oral with dinner  sodium chloride 0.9%. 1000 milliLiter(s) (100 mL/Hr) IV Continuous <Continuous>    MEDICATIONS  (PRN):  acetaminophen     Tablet .. 650 milliGRAM(s) Oral every 6 hours PRN Temp greater or equal to 38C (100.4F), Mild Pain (1 - 3)  aluminum hydroxide/magnesium hydroxide/simethicone Suspension 30 milliLiter(s) Oral every 4 hours PRN Dyspepsia  melatonin 3 milliGRAM(s) Oral at bedtime PRN Insomnia  ondansetron Injectable 4 milliGRAM(s) IV Push every 8 hours PRN Nausea and/or Vomiting        ROS: all systems reviewed and wnl      PHYSICAL EXAMINATION:  Vital Signs Last 24 Hrs  T(C): 36.3 (14 Dec 2021 07:40), Max: 36.7 (14 Dec 2021 02:11)  T(F): 97.4 (14 Dec 2021 07:40), Max: 98.1 (14 Dec 2021 02:11)  HR: 80 (14 Dec 2021 07:40) (72 - 92)  BP: 105/66 (14 Dec 2021 07:40) (65/28 - 107/76)  BP(mean): 65 (14 Dec 2021 00:09) (62 - 65)  RR: 16 (14 Dec 2021 07:40) (13 - 20)  SpO2: 98% (14 Dec 2021 07:40) (94% - 100%)  CAPILLARY BLOOD GLUCOSE  165 (13 Dec 2021 21:09)      POCT Blood Glucose.: 165 mg/dL (13 Dec 2021 20:59)        GENERAL: stable, seen in ER, comfortable, alert, no focal deficits  NECK: supple, No JVD  CHEST/LUNG: clear to auscultation bilaterally; no rales, rhonchi, or wheezing b/l  HEART: normal S1, S2  ABDOMEN: BS+, soft, ND, NT   EXTREMITIES:  pulses palpable; no clubbing, cyanosis, or edema b/l LEs  SKIN: no rashes or lesions      LABS:                        10.0   5.81  )-----------( 144      ( 14 Dec 2021 05:43 )             30.3     12-14    136  |  100  |  20  ----------------------------<  91  3.3<L>   |  25  |  1.01    Ca    8.9      14 Dec 2021 05:42  Phos  3.1     12-  Mg     2.0     -    TPro  6.2  /  Alb  3.7  /  TBili  0.6  /  DBili  x   /  AST  40  /  ALT  27  /  AlkPhos  66  12-13    PT/INR - ( 13 Dec 2021 21:14 )   PT: 25.3 sec;   INR: 2.19 ratio         PTT - ( 13 Dec 2021 21:14 )  PTT:31.7 sec  Urinalysis Basic - ( 14 Dec 2021 03:46 )    Color: Yellow / Appearance: Clear / S.046 / pH: x  Gluc: x / Ketone: Negative  / Bili: Negative / Urobili: Negative   Blood: x / Protein: Trace / Nitrite: Negative   Leuk Esterase: Negative / RBC: 0 /hpf / WBC 1 /HPF   Sq Epi: x / Non Sq Epi: 0 /hpf / Bacteria: Negative

## 2021-12-14 NOTE — PROGRESS NOTE ADULT - PROBLEM SELECTOR PLAN 2
pt hypotensive on arrival 60/30 --> now improved to 90/50s  pt on multiple antiHTN at home including coreg, chlorthalidone, ramipril, ?lasix   c/w IVF overnight   holding all home antiHTN  q4h vitals for now pt hypotensive on arrival 60/30 --> now improved to 90/50s  pt on multiple antiHTN at home including coreg, chlorthalidone, ramipril, ?lasix   c/w IVF, holding all home antiHTN  q4h vitals for now

## 2021-12-14 NOTE — PHYSICAL THERAPY INITIAL EVALUATION ADULT - ADDITIONAL COMMENTS
Pt lives alone in 1st floor apartment, no stairs to negotiate. Son lives nearby, stops in daily, takes pt to shopping, appointments, etc. Pt has cane, often carries it. Pt had a recent fall (prior to this one) was starting home PT 1st session on the day of the fall that brought her into the hospital. Pt takes baths independently. Son does laundry for her.

## 2021-12-14 NOTE — PROGRESS NOTE ADULT - PROBLEM SELECTOR PLAN 4
currently in rate controlled Afib  holding coreg 2/2 hypotension   per neuro, can c/w xarelto for AC   monitor on tele currently in rate controlled Afib, continue Amiodarone 200 mg/day.

## 2021-12-14 NOTE — PROGRESS NOTE ADULT - PROBLEM SELECTOR PLAN 5
c/w xarelto  fall, aspiration, seizure precautions   NPO pending dysphagia screen c/w Xarelto 15 mg/day.

## 2021-12-14 NOTE — PROGRESS NOTE ADULT - PROBLEM SELECTOR PLAN 3
hypoNa 129 - possibly 2/2 poor PO vs 2/2 chlorthalidone effect vs SIADH  c/w IVF for now   check urine lytes, osm, serum osm   recheck BMP in AM hypoNa 129 - possibly 2/2 poor PO vs 2/2 chlorthalidone effect vs SIADH  c/w IVF for now, better today at 136, follow in AM. Stop HCTZ.

## 2021-12-14 NOTE — ED ADULT NURSE REASSESSMENT NOTE - NS ED NURSE REASSESS COMMENT FT1
np jassi aware of patients hypotension, IV infusing. pt asymptomatic denies dizziness, chest pain, shortness of breath.
pt moved to [place], pt sleeping in between care. 2mm pupils pERRL. updated on plan of care.
Pt received from MARIA FERNANDA Ceja in green, alert and oriented times four, breathing spontaneous, unlabored without distress on room air, left side facial droop, slurred speech, NAD, VSS, CM paced rhythm, admitted to tele for hyponatremia/ facial droop, awaits bed

## 2021-12-14 NOTE — PHYSICAL THERAPY INITIAL EVALUATION ADULT - PERTINENT HX OF CURRENT PROBLEM, REHAB EVAL
89 y/oF admitted 12/13 p/w L facial droop. Pt had a fall when working with PT earlier in the day, lost balance and fell backward hitting head on the wall. denies any LOC but endorses significant bleeding from a head laceration. She denies any prodromal lightheadedness, dizziness, CP, SOB, palpitations. She states she has balance issues at baseline with frequent falls, last fall 2 weeks ago. EMS was called and pt taken to [facility], where she had CT head showing no bleed and had her laceration stapled 89 y/oF admitted 12/13 p/w L facial droop. Pt had a fall earlier in the day, (was opening the door for 1st session of home PT) lost balance and fell backward hitting head on the wall. denies any LOC but endorses significant bleeding from a head laceration. She denies any prodromal lightheadedness, dizziness, CP, SOB, palpitations. She states she has balance issues at baseline with frequent falls, last fall 2 weeks ago. EMS was called and pt taken to [facility],

## 2021-12-14 NOTE — PHYSICAL THERAPY INITIAL EVALUATION ADULT - PRECAUTIONS/LIMITATIONS, REHAB EVAL
and sent home. Pt states she was feeling well until right after dinner, when she started feeling dizzy and has poor recollection of events thereafter. Son started noticing facial droop at around 1930 this evening, with assocated drooling and noted food was dropping out of her mouth and incoherent speech. Pt did not lose consciousness and son did not note any seizure like activity. Brought to the ED as code stroke. CTH neg for acute bleed or infarcts. 24 hr CT and MRI pending. PMH Afib on xarelto, HTN, HLD, s/p AICD, [place] where she had CT head showing no bleed and had her laceration stapled and sent home. Pt states she was feeling well until right after dinner, when she started feeling dizzy and has poor recollection of events thereafter. Son started noticing facial droop at around 1930 this evening, with assocated drooling and noted food was dropping out of her mouth and incoherent speech. Pt did not lose consciousness and son did not note any seizure like activity. Brought to the ED as code stroke. CTH neg for acute bleed or infarcts. 24 hr CT and MRI pending. PMH Afib on xarelto, HTN, HLD, s/p AICD, [place]/fall precautions

## 2021-12-15 LAB
CULTURE RESULTS: SIGNIFICANT CHANGE UP
SPECIMEN SOURCE: SIGNIFICANT CHANGE UP
TSH SERPL-MCNC: 3.57 UIU/ML — SIGNIFICANT CHANGE UP (ref 0.27–4.2)

## 2021-12-15 PROCEDURE — 93280 PM DEVICE PROGR EVAL DUAL: CPT | Mod: 26

## 2021-12-15 RX ORDER — INFLUENZA VIRUS VACCINE 15; 15; 15; 15 UG/.5ML; UG/.5ML; UG/.5ML; UG/.5ML
0.7 SUSPENSION INTRAMUSCULAR ONCE
Refills: 0 | Status: DISCONTINUED | OUTPATIENT
Start: 2021-12-15 | End: 2021-12-16

## 2021-12-15 RX ADMIN — Medication 1 MILLIGRAM(S): at 11:18

## 2021-12-15 RX ADMIN — SODIUM CHLORIDE 60 MILLILITER(S): 9 INJECTION INTRAMUSCULAR; INTRAVENOUS; SUBCUTANEOUS at 08:42

## 2021-12-15 RX ADMIN — ATORVASTATIN CALCIUM 10 MILLIGRAM(S): 80 TABLET, FILM COATED ORAL at 21:51

## 2021-12-15 RX ADMIN — RIVAROXABAN 15 MILLIGRAM(S): KIT at 18:10

## 2021-12-15 RX ADMIN — AMIODARONE HYDROCHLORIDE 200 MILLIGRAM(S): 400 TABLET ORAL at 05:42

## 2021-12-15 NOTE — PROGRESS NOTE ADULT - PROBLEM SELECTOR PLAN 2
pt hypotensive on arrival 60/30 --> now improved to 90/50s  pt on multiple antiHTN at home including coreg, chlorthalidone, ramipril, ?lasix   c/w IVF, holding all home antiHTN  q4h vitals for now pt hypotensive on arrival 60/30 --> now improved to 90/50s  pt on multiple antiHTN at home including coreg, chlorthalidone, ramipril, ?lasix, all stopped   c/w IVF, holding all home antiHTN  q4h vitals for now, tele is paced

## 2021-12-15 NOTE — PROGRESS NOTE ADULT - PROBLEM SELECTOR PLAN 3
hypoNa 129 - possibly 2/2 poor PO vs 2/2 chlorthalidone effect vs SIADH  c/w IVF for now, better today at 136, follow in AM. Stop HCTZ.

## 2021-12-15 NOTE — PROGRESS NOTE ADULT - SUBJECTIVE AND OBJECTIVE BOX
INTERVAL HPI/OVERNIGHT EVENTS:  Pt seen and examined at bedside.     Allergies/Intolerance: No Known Allergies      MEDICATIONS  (STANDING):  aMIOdarone    Tablet 200 milliGRAM(s) Oral daily  atorvastatin 10 milliGRAM(s) Oral at bedtime  folic acid 1 milliGRAM(s) Oral daily  influenza  Vaccine (HIGH DOSE) 0.7 milliLiter(s) IntraMuscular once  rivaroxaban 15 milliGRAM(s) Oral with dinner  sodium chloride 0.9%. 1000 milliLiter(s) (60 mL/Hr) IV Continuous <Continuous>    MEDICATIONS  (PRN):  acetaminophen     Tablet .. 650 milliGRAM(s) Oral every 6 hours PRN Temp greater or equal to 38C (100.4F), Mild Pain (1 - 3)  aluminum hydroxide/magnesium hydroxide/simethicone Suspension 30 milliLiter(s) Oral every 4 hours PRN Dyspepsia  melatonin 3 milliGRAM(s) Oral at bedtime PRN Insomnia        ROS: all systems reviewed and wnl      PHYSICAL EXAMINATION:  Vital Signs Last 24 Hrs  T(C): 36.7 (15 Dec 2021 04:28), Max: 37.2 (14 Dec 2021 19:07)  T(F): 98 (15 Dec 2021 04:28), Max: 99 (14 Dec 2021 19:07)  HR: 90 (15 Dec 2021 04:28) (70 - 90)  BP: 106/67 (15 Dec 2021 04:28) (93/53 - 113/65)  BP(mean): --  RR: 18 (15 Dec 2021 04:28) (17 - 18)  SpO2: 97% (15 Dec 2021 04:28) (95% - 100%)  CAPILLARY BLOOD GLUCOSE           @ 07:  -  12-15 @ 07:00  --------------------------------------------------------  IN: 0 mL / OUT: 0 mL / NET: 0 mL    12-15 @ 07:  -  12-15 @ 10:32  --------------------------------------------------------  IN: 120 mL / OUT: 0 mL / NET: 120 mL        GENERAL:   NECK: supple, No JVD  CHEST/LUNG: clear to auscultation bilaterally; no rales, rhonchi, or wheezing b/l  HEART: normal S1, S2  ABDOMEN: BS+, soft, ND, NT   EXTREMITIES:  pulses palpable; no clubbing, cyanosis, or edema b/l LEs  SKIN: no rashes or lesions      LABS:                        10.0   5.81  )-----------( 144      ( 14 Dec 2021 05:43 )             30.3     12-14    136  |  100  |  20  ----------------------------<  91  3.3<L>   |  25  |  1.01    Ca    8.9      14 Dec 2021 05:42  Phos  3.1     12-14  Mg     2.0     12-14    TPro  6.2  /  Alb  3.7  /  TBili  0.6  /  DBili  x   /  AST  40  /  ALT  27  /  AlkPhos  66  12-13    PT/INR - ( 13 Dec 2021 21:14 )   PT: 25.3 sec;   INR: 2.19 ratio         PTT - ( 13 Dec 2021 21:14 )  PTT:31.7 sec  Urinalysis Basic - ( 14 Dec 2021 03:46 )    Color: Yellow / Appearance: Clear / S.046 / pH: x  Gluc: x / Ketone: Negative  / Bili: Negative / Urobili: Negative   Blood: x / Protein: Trace / Nitrite: Negative   Leuk Esterase: Negative / RBC: 0 /hpf / WBC 1 /HPF   Sq Epi: x / Non Sq Epi: 0 /hpf / Bacteria: Negative             INTERVAL HPI/OVERNIGHT EVENTS:  Pt seen and examined at bedside.     Allergies/Intolerance: No Known Allergies      MEDICATIONS  (STANDING):  aMIOdarone    Tablet 200 milliGRAM(s) Oral daily  atorvastatin 10 milliGRAM(s) Oral at bedtime  folic acid 1 milliGRAM(s) Oral daily  influenza  Vaccine (HIGH DOSE) 0.7 milliLiter(s) IntraMuscular once  rivaroxaban 15 milliGRAM(s) Oral with dinner  sodium chloride 0.9%. 1000 milliLiter(s) (60 mL/Hr) IV Continuous <Continuous>    MEDICATIONS  (PRN):  acetaminophen     Tablet .. 650 milliGRAM(s) Oral every 6 hours PRN Temp greater or equal to 38C (100.4F), Mild Pain (1 - 3)  aluminum hydroxide/magnesium hydroxide/simethicone Suspension 30 milliLiter(s) Oral every 4 hours PRN Dyspepsia  melatonin 3 milliGRAM(s) Oral at bedtime PRN Insomnia        ROS: all systems reviewed and wnl      PHYSICAL EXAMINATION:  Vital Signs Last 24 Hrs  T(C): 36.7 (15 Dec 2021 04:28), Max: 37.2 (14 Dec 2021 19:07)  T(F): 98 (15 Dec 2021 04:28), Max: 99 (14 Dec 2021 19:07)  HR: 90 (15 Dec 2021 04:28) (70 - 90)  BP: 106/67 (15 Dec 2021 04:28) (93/53 - 113/65)  BP(mean): --  RR: 18 (15 Dec 2021 04:28) (17 - 18)  SpO2: 97% (15 Dec 2021 04:28) (95% - 100%)  CAPILLARY BLOOD GLUCOSE           @ 07:  -  12-15 @ 07:00  --------------------------------------------------------  IN: 0 mL / OUT: 0 mL / NET: 0 mL    12-15 @ 07:  -  12-15 @ 10:32  --------------------------------------------------------  IN: 120 mL / OUT: 0 mL / NET: 120 mL        GENERAL: stable, in bed, eager for discharge, no CP or SOB  NECK: supple, No JVD  CHEST/LUNG: clear to auscultation bilaterally; no rales, rhonchi, or wheezing b/l  HEART: normal S1, S2  ABDOMEN: BS+, soft, ND, NT   EXTREMITIES:  pulses palpable; no clubbing, cyanosis, or edema b/l LEs  SKIN: no rashes or lesions      LABS:                        10.0   5.81  )-----------( 144      ( 14 Dec 2021 05:43 )             30.3     12-14    136  |  100  |  20  ----------------------------<  91  3.3<L>   |  25  |  1.01    Ca    8.9      14 Dec 2021 05:42  Phos  3.1     -  Mg     2.0     -    TPro  6.2  /  Alb  3.7  /  TBili  0.6  /  DBili  x   /  AST  40  /  ALT  27  /  AlkPhos  66  12-13    PT/INR - ( 13 Dec 2021 21:14 )   PT: 25.3 sec;   INR: 2.19 ratio         PTT - ( 13 Dec 2021 21:14 )  PTT:31.7 sec  Urinalysis Basic - ( 14 Dec 2021 03:46 )    Color: Yellow / Appearance: Clear / S.046 / pH: x  Gluc: x / Ketone: Negative  / Bili: Negative / Urobili: Negative   Blood: x / Protein: Trace / Nitrite: Negative   Leuk Esterase: Negative / RBC: 0 /hpf / WBC 1 /HPF   Sq Epi: x / Non Sq Epi: 0 /hpf / Bacteria: Negative

## 2021-12-15 NOTE — PROGRESS NOTE ADULT - SUBJECTIVE AND OBJECTIVE BOX
Neurology Progress Note    S: Patient seen and examined. No new events overnight. patient denied CP, SOB, HA or pain.     Medication:  acetaminophen     Tablet .. 650 milliGRAM(s) Oral every 6 hours PRN  aluminum hydroxide/magnesium hydroxide/simethicone Suspension 30 milliLiter(s) Oral every 4 hours PRN  aMIOdarone    Tablet 200 milliGRAM(s) Oral daily  atorvastatin 10 milliGRAM(s) Oral at bedtime  folic acid 1 milliGRAM(s) Oral daily  influenza  Vaccine (HIGH DOSE) 0.7 milliLiter(s) IntraMuscular once  melatonin 3 milliGRAM(s) Oral at bedtime PRN  rivaroxaban 15 milliGRAM(s) Oral with dinner      Vitals:  Vital Signs Last 24 Hrs  T(C): 36.7 (15 Dec 2021 04:28), Max: 37.2 (14 Dec 2021 19:07)  T(F): 98 (15 Dec 2021 04:28), Max: 99 (14 Dec 2021 19:07)  HR: 90 (15 Dec 2021 04:28) (70 - 90)  BP: 106/67 (15 Dec 2021 04:28) (93/53 - 113/65)  BP(mean): --  RR: 18 (15 Dec 2021 04:28) (17 - 18)  SpO2: 97% (15 Dec 2021 04:28) (95% - 100%)    General Exam:   General Appearance: Appropriately dressed and in no acute distress       Head: Normocephalic, atraumatic and no dysmorphic features  Ear, Nose, and Throat: Moist mucous membranes  CVS: S1S2+  Resp: No SOB, no wheeze or rhonchi  Abd: soft NTND  Extremities: No edema, no cyanosis  Skin: No bruises, no rashes     Neurological Exam:  Mental Status: Awake, alert and oriented x 3.  Able to follow simple and complex verbal commands. Able to name and repeat. fluent speech. No obvious aphasia or dysarthria noted.   Cranial Nerves: PERRL, EOMI, VFFC, sensation V1-V3 intact,  mild facial asymmetry improving  , equal elevation of palate, scm/trap 5/5, tongue is midline on protrusion. no obvious papilledema on fundoscopic exam. Hearing is grossly intact.   Motor: Normal bulk, tone and strength throughout. Fine finger movements were intact and symmetric. no tremors or drift noted.    Sensation: Intact to light touch and pinprick throughout. no right/left confusion. no extinction to tactile on DSS.    Reflexes: 1+ throughout at biceps, brachioradialis, triceps, patellars and ankles bilaterally and equal. No clonus. R toe and L toe were both downgoing.  Coordination: No dysmetria on FNF    Gait: deferred     I personally reviewed the below data/images/labs:      CBC Full  -  ( 14 Dec 2021 05:43 )  WBC Count : 5.81 K/uL  RBC Count : 3.19 M/uL  Hemoglobin : 10.0 g/dL  Hematocrit : 30.3 %  Platelet Count - Automated : 144 K/uL  Mean Cell Volume : 95.0 fl  Mean Cell Hemoglobin : 31.3 pg  Mean Cell Hemoglobin Concentration : 33.0 gm/dL  Auto Neutrophil # : 3.57 K/uL  Auto Lymphocyte # : 1.58 K/uL  Auto Monocyte # : 0.57 K/uL  Auto Eosinophil # : 0.08 K/uL  Auto Basophil # : 0.00 K/uL  Auto Neutrophil % : 61.4 %  Auto Lymphocyte % : 27.2 %  Auto Monocyte % : 9.8 %  Auto Eosinophil % : 1.4 %  Auto Basophil % : 0.0 %    -    136  |  100  |  20  ----------------------------<  91  3.3<L>   |  25  |  1.01    Ca    8.9      14 Dec 2021 05:42  Phos  3.1     12-14  Mg     2.0     12-14    TPro  6.2  /  Alb  3.7  /  TBili  0.6  /  DBili  x   /  AST  40  /  ALT  27  /  AlkPhos  66  12-13    LIVER FUNCTIONS - ( 13 Dec 2021 21:14 )  Alb: 3.7 g/dL / Pro: 6.2 g/dL / ALK PHOS: 66 U/L / ALT: 27 U/L / AST: 40 U/L / GGT: x           PT/INR - ( 13 Dec 2021 21:14 )   PT: 25.3 sec;   INR: 2.19 ratio         PTT - ( 13 Dec 2021 21:14 )  PTT:31.7 sec  Urinalysis Basic - ( 14 Dec 2021 03:46 )    Color: Yellow / Appearance: Clear / S.046 / pH: x  Gluc: x / Ketone: Negative  / Bili: Negative / Urobili: Negative   Blood: x / Protein: Trace / Nitrite: Negative   Leuk Esterase: Negative / RBC: 0 /hpf / WBC 1 /HPF   Sq Epi: x / Non Sq Epi: 0 /hpf / Bacteria: Negative    < from: CT Angio Neck w/ IV Cont (21 @ 21:42) >    ACC: [id number] EXAM:  CT ANGIO BRAIN (W)AW IC                        ACC: [id number] EXAM:  CT ANGIO NECK (W)AW IC                        ACC: [id number] EXAM:  CT PERFUSION W MAPS IC                          PROCEDURE DATE:  2021          INTERPRETATION:  CLINICAL HISTORY: Stroke code.    TECHNIQUE:  During IV contrast administration, serial thin section CT images of the   brain were obtained for CT perfusion. The raw data was sent to [facility]   Ischemia View for post processing.  Axial CT images were then acquired through the  neck and head  during the   arterial phase.  Intravenous contrast total:  120 cc of Omnipaque-300 mg/ml were   administered; 80 cc were discarded.  Three-dimensional MIP reformats were generated.    COMPARISON STUDY: None.    FINDINGS:    CT PERFUSION:    Perfusion parameters are as follows:    CBF <30%: 0 mL  Tmax >6 seconds: 0 mL  Mismatch volume: 0 mL  Mismatch ratio: None.    CT ANGIOGRAPHY NECK:    Thoracic aorta and branch vessels: Patent. Atherosclerotic   calcifications.  No flow-limiting stenosis.  No evidence of dissection.    Right carotid system: Patent.  No atherosclerosis. No hemodynamically   significant stenosis using NASCET criteria.  No evidence of dissection.    Left carotid system: Patent.No atherosclerosis.  No hemodynamically   significant stenosis using NASCET criteria.  No evidence of dissection.   Nonspecific short segment beaded appearance to the mid left internal   carotid artery.    Vertebral arteries: Patent.  No atherosclerosis.  No flow-limiting   stenosis.  No evidence of dissection.    Soft tissues of the neck: Unremarkable.    Visualized spine: Multilevel degenerative change.    Visualized upper chest: Rounded peripherally calcified right paratracheal   structure is incompletely imaged on this examination.    CT ANGIOGRAPHY BRAIN:    Internal carotid arteries: Patent bilaterally. No flow limiting stenosis.    Anterior cerebral arteries: Patent bilaterally without flow limiting   stenosis.    Middle cerebral arteries: Patent bilaterally without flow limiting    stenosis.    Anterior communicating artery: Poorly visualized.    Posterior communicating arteries: Visualized bilaterally.    Posterior cerebral arteries: Patent bilaterally without stenosis. Right   fetal origin.    Vertebrobasilar: Patent without stenosis. The distal vertebral arteries   are similar in caliber.    Vascular lesions: No evidence of intracranial aneurysm within limits of   CTA technique.  Tiny aneurysms may be beyond the resolution of this   examination.    Dural venous sinuses: Grossly patent.    IMPRESSION:    CTA Neck: No significant flow-limiting stenosis or evidence of acute   dissection within the cervical carotid or vertebral arteries. No specific   short segment beaded appearance to the mid left cervical internal carotid   artery for which focal fibromuscular dysplasia is not excluded.    Partially imaged 2.6 cm right paratracheal peripherally calcified lesion.   Dedicated nonemergent chest imaging may be obtained asclinically   warranted.    CTA Head: No proximal large vessel occlusion.    CT Perfusion: No perfusion abnormality.    --- End of Report ---            TU CONNER MD; Attending Radiologist  This document has been electronically signed. Dec 13 2021  9:43PM    < end of copied text >  < from: CT Head No Cont (21 @ 22:34) >    ACC: [id number] EXAM:  CT BRAIN                          PROCEDURE DATE:  2021          INTERPRETATION:  HEAD CT    INDICATIONS: 89F  Afib, Left facial droop, generalized weakness and   confusion, on xarelto, HTN, HLD, s/p AICD, [place] p/w L facial droop,  after   mechanical fall earlier; a/f stroke work-up, hyponatremia  code stroke, follow-up    TECHNIQUE:    HEAD CT:    Serial axial images were obtained from the skull base to the vertex   without the use of intravenous contrast and reconstructed in sagittal and   coronal planes    COMPARISON EXAMINATION: Head CT, CT perfusion, CTA, 2021    FINDINGS:    VENTRICLES AND SULCI: Redemonstration moderate-to-marked enlargement of   the ventricles and sulci consistent with volume loss with posterior fossa   arachnoid cyst and denise cisterna magna.    INTRA-AXIAL: Unchanged white matter decreased attenuation, likely   microvascular disease with age indeterminate lacunar infarcts, as well as   pontine decreased attenuation that may reflect microvascular disease,   altered sodium metabolism. Unchanged partially empty sella. No new   intracranial mass, acute hemorrhage, or midline shift, carotid siphon   calcification, basal cisterns are patent.  EXTRA-AXIAL: No extra-axial fluid collection is present.  INTRACRANIAL HEMORRHAGE: No new intracranial hemorrhage    VISUALIZED SINUSES: No air-fluid levels are identified minimal, sinus   mucosal thickening.    MASTOIDS: Mastoid tip slight sclerosis opacification, degenerative   temporomandibular joints CALVARIUM: Demonstration right frontoparietal   scalp soft tissue swelling and scalp staples without underlying fracture.   Degenerative craniocervical junction.    MISCELLANEOUS:  Absent orbital lenses with cataract surgery    IMPRESSION:    Redemonstration volume loss, partially empty sella, microvascular   disease, age indeterminate lacunar infarcts, no new hemorrhage or midline   shift. Right frontoparietal scalp soft tissue swelling and scalp staples.   If symptoms persist consider follow-up head CT or MR if no   contraindication.          --- End of Report ---            JUVENTINO NORWOOD MD; Attending Radiologist  This document has been electronically signed. Dec 15 2021  8:39AM    < end of copied text >

## 2021-12-15 NOTE — PROVIDER CONTACT NOTE (OTHER) - SITUATION
notified by tele tech that pt. was Vpaced a fib. pt. displayed inappropriate mode switching on pacemaker

## 2021-12-15 NOTE — PATIENT PROFILE ADULT - FALL HARM RISK - HARM RISK INTERVENTIONS

## 2021-12-15 NOTE — PATIENT PROFILE ADULT - WILL THE PATIENT ACCEPT THE PFIZER COVID-19 VACCINE IF ELIGIBLE AND IT IS AVAILABLE?
Aniket Macias   10/30/2018 10:40 AM   Anticoagulation Therapy Visit    Description:  79 year old male   Provider:  [facility]   Department:  [facility]           INR as of 10/30/2018     Today's INR 3.0      Anticoagulation Summary as of 10/30/2018     INR goal 2.0-3.0   Today's INR 3.0   Full warfarin instructions 2.5 mg on Tue; 5 mg all other days   Next INR check 12/3/2018    Indications   Long-term (current) use of anticoagulants [Z79.01] [Z79.01]  Paroxysmal atrial fibrillation [I48.0]         Your next Anticoagulation Clinic appointment(s)     Dec 03, 2018  1:00 PM CST   Anticoagulation Visit with [facility]   [facility])    [address] [place] [id number] [phone] OPT 2              Contact Numbers     Anticoagulant (INR) Clinic Number: [phone]          October 2018 Details    Sun Mon Tue Wed Thu Fri Sat      1               2               3               4               5               6                 7               8               9               10               11               12               13                 14               15               16               17               18               19               20                 21               22               23               24               25               26               27                 28               29               30      2.5 mg   See details      31      5 mg             Date Details   10/30 This INR check               How to take your warfarin dose     To take:  2.5 mg Take 0.5 of a 5 mg tablet.    To take:  5 mg Take 1 of the 5 mg tablets.           November 2018 Details    Sun Mon Tue Wed Thu Fri Sat         1      5 mg         2      5 mg         3      5 mg           4      5 mg         5      5 mg         6      2.5 mg         7      5 mg         8      5 mg         9      5 mg         10      5 mg            11      5 mg         12      5 mg         13      2.5 mg         14      5 mg         15      5 mg         16      5 mg         17      5 mg           18      5 mg         19      5 mg         20      2.5 mg         21      5 mg         22      5 mg         23      5 mg         24      5 mg           25      5 mg         26      5 mg         27      2.5 mg         28      5 mg         29      5 mg         30      5 mg           Date Details   No additional details            How to take your warfarin dose     To take:  2.5 mg Take 0.5 of a 5 mg tablet.    To take:  5 mg Take 1 of the 5 mg tablets.           December 2018 Details    Sun Mon Tue Wed Thu Fri Sat           1      5 mg           2      5 mg         3            4               5               6               7               8                 9               10               11               12               13               14               15                 16               17               18               19               20               21               22                 23               24               25               26               27               28               29                 30               31                     Date Details   No additional details    Date of next INR:  12/3/2018         How to take your warfarin dose     To take:  5 mg Take 1 of the 5 mg tablets.            Not applicable

## 2021-12-15 NOTE — PROCEDURE NOTE - ADDITIONAL PROCEDURE DETAILS
Indication: intermittent Ventricular pacing noted at rate of 130bpm. Patient is intermittently tracking at upper rate limit of device.   Patient is in AFib rate 80's, ventricular pacing on demand   - patient appeared to have undersensing of p waves.  - normal ventricular sensing and pacing threshold. Stable lead impedance  - Review of stored data revealed  mode switch episodes for AFib  - changes made: atrial sensitivity reduced from 0.2 to to 0.1mv, and max track/ PMT rate reduced to 110bpm     [id number]

## 2021-12-15 NOTE — PROGRESS NOTE ADULT - PROBLEM SELECTOR PLAN 1
pt p/w new onset L facial droop c/f CVA. CTH brain/perfusions/CTA H+N all without e/o of infract or bleed. On exam, still with facial asymmetry, rest of exam is non focal, strength otherwise appears intact. ?ischemia in setting of hypotension vs embolic in setting of known Afib vs ?metabolic/infectious in nature.     - repeat 24hr CT head; will need further information regarding cardiac device before proceeding with MRI   - can c/w home dose xarelto 15 mg/day  - monitor on tele   - check lipid, A1c, TSH   - c/w IVF for hypoNa and holding chlorthalidone - repeat BMP in AM   - pt with no fevers, normal WBC, no infectious symptoms - CXR showing ?RLL opacity, low suspicion for PNA given no resp symptoms; awaiting UA/COVID - defer abx for now   - PT eval, Swallow cleared for diet today. pt p/w new onset L facial droop c/f CVA. CTH brain/perfusions/CTA H+N all without e/o of infract or bleed. On exam, still with facial asymmetry, rest of exam is non focal, strength otherwise appears intact. ?ischemia in setting of hypotension vs embolic in setting of known Afib vs ?metabolic/infectious in nature.     - can c/w home dose xarelto 15 mg/day  - monitor on tele   - check lipid, A1c, TSH   - stop IVF for hypoNa and holding chlorthalidone - repeat BMP in AM   - pt with no fevers, normal WBC, no infectious symptoms - CXR showing ?RLL opacity, low suspicion for PNA given no resp symptoms; awaiting UA/COVID - defer abx for now   - PT eval, regular diet.    Discharge home tomorrow.

## 2021-12-16 VITALS
HEART RATE: 82 BPM | DIASTOLIC BLOOD PRESSURE: 78 MMHG | OXYGEN SATURATION: 98 % | RESPIRATION RATE: 18 BRPM | SYSTOLIC BLOOD PRESSURE: 112 MMHG | TEMPERATURE: 98 F

## 2021-12-16 LAB
ANION GAP SERPL CALC-SCNC: 12 MMOL/L — SIGNIFICANT CHANGE UP (ref 5–17)
BUN SERPL-MCNC: 11 MG/DL — SIGNIFICANT CHANGE UP (ref 7–23)
CALCIUM SERPL-MCNC: 9.1 MG/DL — SIGNIFICANT CHANGE UP (ref 8.4–10.5)
CHLORIDE SERPL-SCNC: 99 MMOL/L — SIGNIFICANT CHANGE UP (ref 96–108)
CO2 SERPL-SCNC: 27 MMOL/L — SIGNIFICANT CHANGE UP (ref 22–31)
CREAT SERPL-MCNC: 0.94 MG/DL — SIGNIFICANT CHANGE UP (ref 0.5–1.3)
GLUCOSE SERPL-MCNC: 90 MG/DL — SIGNIFICANT CHANGE UP (ref 70–99)
HCT VFR BLD CALC: 31.7 % — LOW (ref 34.5–45)
HGB BLD-MCNC: 10.3 G/DL — LOW (ref 11.5–15.5)
MCHC RBC-ENTMCNC: 31.3 PG — SIGNIFICANT CHANGE UP (ref 27–34)
MCHC RBC-ENTMCNC: 32.5 GM/DL — SIGNIFICANT CHANGE UP (ref 32–36)
MCV RBC AUTO: 96.4 FL — SIGNIFICANT CHANGE UP (ref 80–100)
NRBC # BLD: 0 /100 WBCS — SIGNIFICANT CHANGE UP (ref 0–0)
PLATELET # BLD AUTO: 150 K/UL — SIGNIFICANT CHANGE UP (ref 150–400)
POTASSIUM SERPL-MCNC: 3 MMOL/L — LOW (ref 3.5–5.3)
POTASSIUM SERPL-SCNC: 3 MMOL/L — LOW (ref 3.5–5.3)
RBC # BLD: 3.29 M/UL — LOW (ref 3.8–5.2)
RBC # FLD: 14.3 % — SIGNIFICANT CHANGE UP (ref 10.3–14.5)
SODIUM SERPL-SCNC: 138 MMOL/L — SIGNIFICANT CHANGE UP (ref 135–145)
WBC # BLD: 6.34 K/UL — SIGNIFICANT CHANGE UP (ref 3.8–10.5)
WBC # FLD AUTO: 6.34 K/UL — SIGNIFICANT CHANGE UP (ref 3.8–10.5)

## 2021-12-16 PROCEDURE — 93010 ELECTROCARDIOGRAM REPORT: CPT

## 2021-12-16 RX ORDER — POTASSIUM CHLORIDE 20 MEQ
20 PACKET (EA) ORAL
Refills: 0 | Status: DISCONTINUED | OUTPATIENT
Start: 2021-12-16 | End: 2021-12-16

## 2021-12-16 RX ORDER — RAMIPRIL 5 MG
1 CAPSULE ORAL
Qty: 0 | Refills: 0 | DISCHARGE

## 2021-12-16 RX ORDER — POTASSIUM CHLORIDE 20 MEQ
20 PACKET (EA) ORAL ONCE
Refills: 0 | Status: COMPLETED | OUTPATIENT
Start: 2021-12-16 | End: 2021-12-16

## 2021-12-16 RX ORDER — POTASSIUM CHLORIDE 20 MEQ
40 PACKET (EA) ORAL ONCE
Refills: 0 | Status: COMPLETED | OUTPATIENT
Start: 2021-12-16 | End: 2021-12-16

## 2021-12-16 RX ORDER — ACETAMINOPHEN 500 MG
2 TABLET ORAL
Qty: 0 | Refills: 0 | DISCHARGE
Start: 2021-12-16

## 2021-12-16 RX ORDER — CARVEDILOL PHOSPHATE 80 MG/1
1 CAPSULE, EXTENDED RELEASE ORAL
Qty: 0 | Refills: 0 | DISCHARGE

## 2021-12-16 RX ORDER — CHLORTHALIDONE 50 MG
1 TABLET ORAL
Qty: 0 | Refills: 0 | DISCHARGE

## 2021-12-16 RX ADMIN — Medication 20 MILLIEQUIVALENT(S): at 08:04

## 2021-12-16 RX ADMIN — Medication 40 MILLIEQUIVALENT(S): at 10:17

## 2021-12-16 RX ADMIN — Medication 1 MILLIGRAM(S): at 10:18

## 2021-12-16 RX ADMIN — Medication 50 MILLIEQUIVALENT(S): at 09:00

## 2021-12-16 RX ADMIN — Medication 1 DROP(S): at 10:18

## 2021-12-16 RX ADMIN — AMIODARONE HYDROCHLORIDE 200 MILLIGRAM(S): 400 TABLET ORAL at 05:24

## 2021-12-16 NOTE — CHART NOTE - NSCHARTNOTEFT_GEN_A_CORE
Patient was informed of referrals and timeframe on (12/16/2021). Will outreach again to confirm follow up appt was made.

## 2021-12-16 NOTE — DISCHARGE NOTE PROVIDER - HOSPITAL COURSE
89F with PMH of Afib on xarelto, HTN, HLD, s/p AICD, [place] p/w L facial droop after mechanical fall earlier; a/f stroke work-up, hyponatremia.      Problem/Plan - 1:  ·  Problem: Acute cerebrovascular accident (CVA).   ·  Plan: pt p/w new onset L facial droop c/f CVA. CTH brain/perfusions/CTA H+N all without e/o of infract or bleed. On exam, still with facial asymmetry, rest of exam is non focal, strength otherwise appears intact. ?ischemia in setting of hypotension vs embolic in setting of known Afib vs ?metabolic/infectious in nature.  - can c/w home dose xarelto 15 mg/day  - pt with no fevers, normal WBC, no infectious symptoms - CXR showing ?RLL opacity, low suspicion for PNA given no resp symptoms  - PT recommend ROSA or home with home PT - Son will take pt home and has outpatient PT set up for patient  , regular diet.         Problem/Plan - 2:  ·  Problem: Hypotension.   ·  Plan: pt hypotensive on arrival 60/30 --> now improved to 121/70s  pt on multiple antiHTN at home including coreg, chlorthalidone, ramipril, ?lasix, all stopped - can continue amiodarone once daily        Problem/Plan - 3:  ·  Problem: Hyponatremia.   ·  Plan: hypoNa 129 - possibly 2/2 poor PO vs 2/2 chlorthalidone effect vs SIADH  s/p IVF, better today at 138. Stop HCTZ.     Problem/Plan - 4:  ·  Problem: Paroxysmal atrial fibrillation.   ·  Plan: currently in rate controlled Afib, continue Amiodarone 200 mg/day.     Problem/Plan - 5:  ·  Problem: Prophylactic measure.   ·  Plan: c/w Xarelto 15 mg/day.    Pt stable for DC with outpatient PT.   89 year old female PMH of chronic Afib on Xarelto, HTN, HLD, s/p AICD, [place] p/w L facial droop after mechanical fall earlier in the day. Had hyponatremia on arrival.       Problem/Plan - 1:  Problem: Possible cerebral ischemia in setting of hypotension vs embolic in setting of known chronic Afib. No infections were found. C/W home dose Xarelto 15 mg/day. No fevers, normal WBC, no infectious symptoms - CXR showing ?RLL opacity, low suspicion for PNA given no resp symptoms, likely atelectasis.   PT recommend ROSA or home with home PT - Son will take pt home and has outpatient PT set up for patient, tolerates regular diet well.      Problem/Plan - 2:  Problem: Hypotension, improved to 121/70 by discharge off meds. On multiple anti-HTN home meds including coreg, chlorthalidone, ramipril, ?lasix, all stopped - Will continue Amiodarone once daily        Problem/Plan - 3:  Problem: Hyponatremia on arrival at 129, possibly 2/2 poor PO intake or chlorthalidone effect which was stopped. Improved to 138 by discharge. Stop HCTZ.     Problem/Plan - 4:  Problem: Paroxysmal atrial fibrillation, rate controlled Afib, continue Amiodarone 200 mg/day and Xarelto 15 mg/day adjusted for age of 89 and weight.    Stable for DC with outpatient PT.   89 year old female PMH of chronic Afib on Xarelto, HTN, HLD, s/p AICD, [place] p/w L facial droop after mechanical fall earlier in the day. Had hyponatremia on arrival.       Problem/Plan - 1:  Problem: Possible cerebral ischemia in setting of hypotension vs embolic in setting of known chronic Afib. No infections were found. C/W home dose Xarelto 15 mg/day. No fevers, normal WBC, no infectious symptoms - CXR showing ?RLL opacity, low suspicion for PNA given no resp symptoms, likely atelectasis.   PT recommend ROSA or home with home PT - Son will take pt home and has outpatient PT set up for patient, tolerates regular diet well. CT head notes no   acute infarct or ICB, scalp hematoma noted with staples.      Problem/Plan - 2:  Problem: Hypotension, improved to 121/70 by discharge off meds. On multiple anti-HTN home meds including coreg, chlorthalidone, ramipril, ?lasix, all stopped - Will continue Amiodarone once daily        Problem/Plan - 3:  Problem: Hyponatremia on arrival at 129, possibly 2/2 poor PO intake or chlorthalidone effect which was stopped. Improved to 138 by discharge. Stop HCTZ.     Problem/Plan - 4:  Problem: Paroxysmal atrial fibrillation, rate controlled Afib, continue Amiodarone 200 mg/day and Xarelto 15 mg/day adjusted for age of 89 and weight.    Stable for DC with outpatient PT.      COVID-19 negative, A1C 5.7, Creatinine normal .94. Remove scalp staples in one week. See med list.        89 year old female PMH of chronic Afib on Xarelto, HTN, HLD, s/p AICD, [place] p/w L facial droop after mechanical fall earlier in the day. Had hyponatremia on arrival.       Problem/Plan - 1:  Problem: Possible cerebral ischemia in setting of hypotension vs embolic in setting of known chronic Afib. No infections were found. C/W home dose Xarelto 15 mg/day. No fevers, normal WBC, no infectious symptoms - CXR showing ?RLL opacity, low suspicion for PNA given no resp symptoms, likely atelectasis.   PT recommend ROSA or home with home PT - Son will take pt home and has outpatient PT set up for patient, tolerates regular diet well. CT head notes no   acute infarct or ICB, scalp hematoma noted with staples.  No MRI was done as she has PPM.       Problem/Plan - 2:  Problem: Hypotension, improved to 121/70 by discharge off meds. On multiple anti-HTN home meds including coreg, chlorthalidone, ramipril, ?lasix, all stopped - Will continue Amiodarone once daily        Problem/Plan - 3:  Problem: Hyponatremia on arrival at 129, possibly 2/2 poor PO intake or chlorthalidone effect which was stopped. Improved to 138 by discharge with normal saline.  Stop HCTZ.     Problem/Plan - 4:  Problem: Paroxysmal atrial fibrillation, rate controlled Afib, continue Amiodarone 200 mg/day and Xarelto 15 mg/day adjusted for age of 89 and weight.    Stable for DC with outpatient PT.      COVID-19 negative, A1C 5.7, Creatinine normal .94. Remove scalp staples in one week. See med list.

## 2021-12-16 NOTE — DISCHARGE NOTE PROVIDER - CARE PROVIDER_API CALL
Tyson Carpenter  CARDIOLOGY  [address] [place] [address], [address]  [place] [address]  Phone: [phone]  Fax: [phone]  Follow Up Time: 1-3 days   Tyson Carpenter  CARDIOLOGY  [address] [facility], [address]  [place] [address]  Phone: [phone]  Fax: [phone]  Follow Up Time: 1-3 days    Devin Cruz)  Neurology; Vascular Neurology  [address] [facility], [address]  [place] [address]  Phone: [phone]  Fax: [phone]  Follow Up Time:

## 2021-12-16 NOTE — PROGRESS NOTE ADULT - ASSESSMENT
89yo Rt handed F with  Afib on xarelto, AICD, HTn, HLD presents to [facility] for facial droop and generalized weakness. LWK 1930 pm when patient was having dinner with family and noticed facial droop with drooling and generalized weakness. LWK 1930 pm with Left facial droop. Patient not a candidate for TPA nor MT candidate. Patient was reexamined after CT scanner and per son, facial droop improved. Patient was noted to be hypotensive with SBPs in the 60s.   CTH chronic L BG lacunar infarct   CTA with question of FMD vs short segment stenosis   repeat CTH  stable   A1c 5.7 LDL 40   Impression   facial droop with generalized weakness now improving. 2/2 to L brain dysfunction from old stroke . Etiology ischemic, . Patient noted to be hypotensive 60/30 in ED. now improved     Recommendation:   - K 3.0 needs supplement.   - c/w DOAC for AF - on xarelto   - MRI brain unlikely to . repeat CTH stable.   - statin therpay for secondary stroke prevention. LDL goal <70mg/dL.  - TTE not needed. AICD interrogation   - telemetry  - BP normotensive   - check FS, glucose control <180  - GI/DVT ppx  - Counseling on diet, exercise, and medication adherence was done  - Counseling on smoking cessation and alcohol consumption offered when appropriate.  - Pain assessed and judicious use of narcotics when appropriate was discussed.    - Stroke education given when appropriate.  - Importance of fall prevention discussed.   - Differential diagnosis and plan of care discussed with patient and/or family and primary team  - Thank you for allowing me to participate in the care of this patient. Call with questions.    - okay for discharge planning from neuro standpoint . ROSA vs home PT. d/c plan today   Devin Cruz MD  Vascular Neurology  Office: [phone]     
89yo Rt handed F with  Afib on xarelto, AICD, HTn, HLD presents to [facility] for facial droop and generalized weakness. LWK 1930 pm when patient was having dinner with family and noticed facial droop with drooling and generalized weakness. LWK 1930 pm with Left facial droop. Patient not a candidate for TPA nor MT candidate. Patient was reexamined after CT scanner and per son, facial droop improved. Patient was noted to be hypotensive with SBPs in the 60s.   CTH chronic L BG lacunar infarct   CTA with question of FMD vs short segment stenosis   repeat CTH  stable   A1c 5.7 LDL 40   Impression   facial droop with generalized weakness now improving. 2/2 to L brain dysfunction from old stroke . Etiology ischemic, . Patient noted to be hypotensive 60/30 in ED. now improved     Recommendation:   - c/w DOAC for AF - on xarelto   - MRI brain unlikely to . repeat CTH stable.   - statin therpay for secondary stroke prevention. LDL goal <70mg/dL.  - TTE not needed. AICD interrogation   - telemetry  - BP normotensive   - check FS, glucose control <180  - GI/DVT ppx  - Counseling on diet, exercise, and medication adherence was done  - Counseling on smoking cessation and alcohol consumption offered when appropriate.  - Pain assessed and judicious use of narcotics when appropriate was discussed.    - Stroke education given when appropriate.  - Importance of fall prevention discussed.   - Differential diagnosis and plan of care discussed with patient and/or family and primary team  - Thank you for allowing me to participate in the care of this patient. Call with questions.    - okay for discharge planning from neuro standpoint . ROSA vs home PT  Devin Cruz MD  Vascular Neurology  Office: [phone]     
89F with PMH of Afib on xarelto, HTN, HLD, s/p AICD, [place] p/w L facial droop after mechanical fall earlier; a/f stroke work-up, hyponatremia. 
89F with PMH of Afib on xarelto, HTN, HLD, s/p AICD, [place] p/w L facial droop after mechanical fall earlier; a/f stroke work-up, hyponatremia. 
89F with PMH of Afib on xarelto, HTN, HLD, s/p AICD, [place] p/w L facial droop after mechanical fall earlier; a/f stroke work-up, hyponatremia.

## 2021-12-16 NOTE — DISCHARGE NOTE PROVIDER - PROVIDER TOKENS
PROVIDER:[TOKEN:[[id number]],FOLLOWUP:[1-3 days]] PROVIDER:[TOKEN:[[id number]],FOLLOWUP:[1-3 days]],PROVIDER:[TOKEN:[[id number]]]

## 2021-12-16 NOTE — DISCHARGE NOTE NURSING/CASE MANAGEMENT/SOCIAL WORK - NSDCPEFALRISK_GEN_ALL_CORE
For information on Fall & Injury Prevention, visit: [email or web address] OR  [email or web address] OR  https://www.cdc.gov/steadi/patient.html

## 2021-12-16 NOTE — PROGRESS NOTE ADULT - PROBLEM SELECTOR PLAN 1
pt p/w new onset L facial droop c/f CVA. CTH brain/perfusions/CTA H+N all without e/o of infract or bleed. On exam, still with facial asymmetry, rest of exam is non focal, strength otherwise appears intact. ?ischemia in setting of hypotension vs embolic in setting of known Afib vs ?metabolic/infectious in nature.     - can c/w home dose xarelto 15 mg/day  - monitor on tele   - check lipid, A1c, TSH   - stop IVF for hypoNa and holding chlorthalidone - repeat BMP in AM   - pt with no fevers, normal WBC, no infectious symptoms - CXR showing ?RLL opacity, low suspicion for PNA given no resp symptoms; awaiting UA/COVID - defer abx for now   - PT eval, regular diet.    Discharge home tomorrow. pt p/w new onset L facial droop c/f CVA. CTH brain/perfusions/CTA H+N all without e/o of infract or bleed. On exam, still with facial asymmetry, rest of exam is non focal, strength otherwise appears intact. ?ischemia in setting of hypotension vs embolic in setting of known Afib vs ?metabolic/infectious in nature.     - can c/w home dose xarelto 15 mg/day  - monitor on tele   - check lipid, A1c, TSH   - stop IVF for hypoNa and holding chlorthalidone - repeat BMP in AM   - pt with no fevers, normal WBC, no infectious symptoms - CXR showing ?RLL opacity, low suspicion for PNA given no resp symptoms; awaiting UA/COVID - defer abx for now   - PT eval, regular diet.    Discharge home tomorrow. Hold all BP meds for now.  Remove scalp stable in one week.

## 2021-12-16 NOTE — DISCHARGE NOTE PROVIDER - NSDCFUADDAPPT_GEN_ALL_CORE_FT
APPTS ARE READY TO BE MADE: [ x] YES    Best Family or Patient Contact (if needed):    Additional Information about above appointments (if needed):    1:   2:   3:     Other comments or requests:    APPTS ARE READY TO BE MADE: [ x] YES    Best Family or Patient Contact (if needed):    Additional Information about above appointments (if needed):    1:   2:   3:     Patient  was advised they currently have a specialist that they will follow up with. Dr Michael A. Nissenbaum [facility]/ [phone])    Patient was previously scheduled with Tyson Flores on 12/20 03:30 PM at [address] [place], [place] [address], [address]    Other comments or requests:

## 2021-12-16 NOTE — PROGRESS NOTE ADULT - SUBJECTIVE AND OBJECTIVE BOX
INTERVAL HPI/OVERNIGHT EVENTS:  Pt seen and examined at bedside.     Allergies/Intolerance: No Known Allergies      MEDICATIONS  (STANDING):  aMIOdarone    Tablet 200 milliGRAM(s) Oral daily  atorvastatin 10 milliGRAM(s) Oral at bedtime  folic acid 1 milliGRAM(s) Oral daily  influenza  Vaccine (HIGH DOSE) 0.7 milliLiter(s) IntraMuscular once  potassium chloride    Tablet ER 40 milliEquivalent(s) Oral once  potassium chloride  20 mEq/100 mL IVPB 20 milliEquivalent(s) IV Intermittent once  rivaroxaban 15 milliGRAM(s) Oral with dinner    MEDICATIONS  (PRN):  acetaminophen     Tablet .. 650 milliGRAM(s) Oral every 6 hours PRN Temp greater or equal to 38C (100.4F), Mild Pain (1 - 3)  aluminum hydroxide/magnesium hydroxide/simethicone Suspension 30 milliLiter(s) Oral every 4 hours PRN Dyspepsia  melatonin 3 milliGRAM(s) Oral at bedtime PRN Insomnia        ROS: all systems reviewed and wnl      PHYSICAL EXAMINATION:  Vital Signs Last 24 Hrs  T(C): 36.4 (16 Dec 2021 04:45), Max: 36.8 (15 Dec 2021 11:54)  T(F): 97.5 (16 Dec 2021 04:45), Max: 98.3 (15 Dec 2021 11:54)  HR: 84 (16 Dec 2021 04:45) (75 - 84)  BP: 112/72 (16 Dec 2021 04:45) (98/61 - 113/70)  BP(mean): --  RR: 18 (16 Dec 2021 04:45) (17 - 19)  SpO2: 95% (16 Dec 2021 04:45) (95% - 99%)  CAPILLARY BLOOD GLUCOSE          12-15 @ 07:01  -  12-16 @ 07:00  --------------------------------------------------------  IN: 440 mL / OUT: 500 mL / NET: -60 mL        GENERAL:   NECK: supple, No JVD  CHEST/LUNG: clear to auscultation bilaterally; no rales, rhonchi, or wheezing b/l  HEART: normal S1, S2  ABDOMEN: BS+, soft, ND, NT   EXTREMITIES:  pulses palpable; no clubbing, cyanosis, or edema b/l LEs  SKIN: no rashes or lesions      LABS:                        10.3   6.34  )-----------( 150      ( 16 Dec 2021 06:48 )             31.7     12-16    138  |  99  |  11  ----------------------------<  90  3.0<L>   |  27  |  0.94    Ca    9.1      16 Dec 2021 06:48                 INTERVAL HPI/OVERNIGHT EVENTS:  Pt seen and examined at bedside.     Allergies/Intolerance: No Known Allergies      MEDICATIONS  (STANDING):  aMIOdarone    Tablet 200 milliGRAM(s) Oral daily  atorvastatin 10 milliGRAM(s) Oral at bedtime  folic acid 1 milliGRAM(s) Oral daily  influenza  Vaccine (HIGH DOSE) 0.7 milliLiter(s) IntraMuscular once  potassium chloride    Tablet ER 40 milliEquivalent(s) Oral once  potassium chloride  20 mEq/100 mL IVPB 20 milliEquivalent(s) IV Intermittent once  rivaroxaban 15 milliGRAM(s) Oral with dinner    MEDICATIONS  (PRN):  acetaminophen     Tablet .. 650 milliGRAM(s) Oral every 6 hours PRN Temp greater or equal to 38C (100.4F), Mild Pain (1 - 3)  aluminum hydroxide/magnesium hydroxide/simethicone Suspension 30 milliLiter(s) Oral every 4 hours PRN Dyspepsia  melatonin 3 milliGRAM(s) Oral at bedtime PRN Insomnia        ROS: all systems reviewed and wnl      PHYSICAL EXAMINATION:  Vital Signs Last 24 Hrs  T(C): 36.4 (16 Dec 2021 04:45), Max: 36.8 (15 Dec 2021 11:54)  T(F): 97.5 (16 Dec 2021 04:45), Max: 98.3 (15 Dec 2021 11:54)  HR: 84 (16 Dec 2021 04:45) (75 - 84)  BP: 112/72 (16 Dec 2021 04:45) (98/61 - 113/70)  BP(mean): --  RR: 18 (16 Dec 2021 04:45) (17 - 19)  SpO2: 95% (16 Dec 2021 04:45) (95% - 99%)  CAPILLARY BLOOD GLUCOSE          12-15 @ 07:01  -  12-16 @ 07:00  --------------------------------------------------------  IN: 440 mL / OUT: 500 mL / NET: -60 mL        GENERAL: stable, eager for discharge.   NECK: supple, No JVD  CHEST/LUNG: clear to auscultation bilaterally; no rales, rhonchi, or wheezing b/l  HEART: normal S1, S2  ABDOMEN: BS+, soft, ND, NT   EXTREMITIES:  pulses palpable; no clubbing, cyanosis, or edema b/l LEs  SKIN: no rashes or lesions      LABS:                        10.3   6.34  )-----------( 150      ( 16 Dec 2021 06:48 )             31.7     12-16    138  |  99  |  11  ----------------------------<  90  3.0<L>   |  27  |  0.94    Ca    9.1      16 Dec 2021 06:48

## 2021-12-16 NOTE — PROGRESS NOTE ADULT - PROBLEM SELECTOR PROBLEM 1
Acute cerebrovascular accident (CVA)

## 2021-12-16 NOTE — DISCHARGE NOTE NURSING/CASE MANAGEMENT/SOCIAL WORK - NSDCFUADDAPPT_GEN_ALL_CORE_FT
APPTS ARE READY TO BE MADE: [ x] YES    Best Family or Patient Contact (if needed):    Additional Information about above appointments (if needed):    1:   2:   3:     Other comments or requests:

## 2021-12-16 NOTE — PROGRESS NOTE ADULT - SUBJECTIVE AND OBJECTIVE BOX
Neurology Progress Note    S: Patient seen and examined. No new events overnight. patient denied CP, SOB, HA or pain. dc plan     Medication:  MEDICATIONS  (STANDING):  aMIOdarone    Tablet 200 milliGRAM(s) Oral daily  artificial  tears Solution 1 Drop(s) Both EYES two times a day  atorvastatin 10 milliGRAM(s) Oral at bedtime  folic acid 1 milliGRAM(s) Oral daily  influenza  Vaccine (HIGH DOSE) 0.7 milliLiter(s) IntraMuscular once  rivaroxaban 15 milliGRAM(s) Oral with dinner    MEDICATIONS  (PRN):  acetaminophen     Tablet .. 650 milliGRAM(s) Oral every 6 hours PRN Temp greater or equal to 38C (100.4F), Mild Pain (1 - 3)  aluminum hydroxide/magnesium hydroxide/simethicone Suspension 30 milliLiter(s) Oral every 4 hours PRN Dyspepsia  melatonin 3 milliGRAM(s) Oral at bedtime PRN Insomnia      Vitals:  Vital Signs Last 24 Hrs  T(C): 36.6 (21 @ 10:51), Max: 36.6 (12-15-21 @ 20:06)  T(F): 97.9 (-21 @ 10:51), Max: 97.9 (-15-21 @ 20:06)  HR: 81 (21 @ 10:51) (79 - 84)  BP: 121/71 (-21 @ 10:51) (112/72 - 121/71)  BP(mean): --  RR: 18 (21 @ 10:51) (17 - 18)  SpO2: 97% (21 @ 10:51) (95% - 99%)    Orthostatic VS  21 @ 15:30  Lying BP: 113/63 HR: 81  Sitting BP: 109/64 HR: 84  Standing BP: 113/64 HR: 84  Site: --  Mode: --      General Exam:   General Appearance: Appropriately dressed and in no acute distress       Head: Normocephalic, atraumatic and no dysmorphic features  Ear, Nose, and Throat: Moist mucous membranes  CVS: S1S2+  Resp: No SOB, no wheeze or rhonchi  Abd: soft NTND  Extremities: No edema, no cyanosis  Skin: No bruises, no rashes     Neurological Exam:  Mental Status: Awake, alert and oriented x 3.  Able to follow simple and complex verbal commands. Able to name and repeat. fluent speech. No obvious aphasia or dysarthria noted.   Cranial Nerves: PERRL, EOMI, VFFC, sensation V1-V3 intact,  mild facial asymmetry improving  , equal elevation of palate, scm/trap 5/5, tongue is midline on protrusion. no obvious papilledema on fundoscopic exam. Hearing is grossly intact.   Motor: Normal bulk, tone and strength throughout. Fine finger movements were intact and symmetric. no tremors or drift noted.    Sensation: Intact to light touch and pinprick throughout. no right/left confusion. no extinction to tactile on DSS.    Reflexes: 1+ throughout at biceps, brachioradialis, triceps, patellars and ankles bilaterally and equal. No clonus. R toe and L toe were both downgoing.  Coordination: No dysmetria on FNF    Gait: deferred     I personally reviewed the below data/images/labs:    CBC Full  -  ( 16 Dec 2021 06:48 )  WBC Count : 6.34 K/uL  RBC Count : 3.29 M/uL  Hemoglobin : 10.3 g/dL  Hematocrit : 31.7 %  Platelet Count - Automated : 150 K/uL  Mean Cell Volume : 96.4 fl  Mean Cell Hemoglobin : 31.3 pg  Mean Cell Hemoglobin Concentration : 32.5 gm/dL  Auto Neutrophil # : x  Auto Lymphocyte # : x  Auto Monocyte # : x  Auto Eosinophil # : x  Auto Basophil # : x  Auto Neutrophil % : x  Auto Lymphocyte % : x  Auto Monocyte % : x  Auto Eosinophil % : x  Auto Basophil % : x     12-16    138  |  99  |  11  ----------------------------<  90  3.0<L>   |  27  |  0.94    Ca    9.1      16 Dec 2021 06:48      PTT - ( 13 Dec 2021 21:14 )  PTT:31.7 sec  Urinalysis Basic - ( 14 Dec 2021 03:46 )    Color: Yellow / Appearance: Clear / S.046 / pH: x  Gluc: x / Ketone: Negative  / Bili: Negative / Urobili: Negative   Blood: x / Protein: Trace / Nitrite: Negative   Leuk Esterase: Negative / RBC: 0 /hpf / WBC 1 /HPF   Sq Epi: x / Non Sq Epi: 0 /hpf / Bacteria: Negative    < from: CT Angio Neck w/ IV Cont (. @ 21:42) >    ACC: [id number] EXAM:  CT ANGIO BRAIN (W)AW IC                        ACC: [id number] EXAM:  CT ANGIO NECK (W)AW IC                        ACC: [id number] EXAM:  CT PERFUSION W MAPS IC                          PROCEDURE DATE:  2021          INTERPRETATION:  CLINICAL HISTORY: Stroke code.    TECHNIQUE:  During IV contrast administration, serial thin section CT images of the   brain were obtained for CT perfusion. The raw data was sent to RAPID   Ischemia View for post processing.  Axial CT images were then acquired through the  neck and head  during the   arterial phase.  Intravenous contrast total:  120 cc of Omnipaque-300 mg/ml were   administered; 80 cc were discarded.  Three-dimensional MIP reformats were generated.    COMPARISON STUDY: None.    FINDINGS:    CT PERFUSION:    Perfusion parameters are as follows:    CBF <30%: 0 mL  Tmax >6 seconds: 0 mL  Mismatch volume: 0 mL  Mismatch ratio: None.    CT ANGIOGRAPHY NECK:    Thoracic aorta and branch vessels: Patent. Atherosclerotic   calcifications.  No flow-limiting stenosis.  No evidence of dissection.    Right carotid system: Patent.  No atherosclerosis. No hemodynamically   significant stenosis using NASCET criteria.  No evidence of dissection.    Left carotid system: Patent.No atherosclerosis.  No hemodynamically   significant stenosis using NASCET criteria.  No evidence of dissection.   Nonspecific short segment beaded appearance to the mid left internal   carotid artery.    Vertebral arteries: Patent.  No atherosclerosis.  No flow-limiting   stenosis.  No evidence of dissection.    Soft tissues of the neck: Unremarkable.    Visualized spine: Multilevel degenerative change.    Visualized upper chest: Rounded peripherally calcified right paratracheal   structure is incompletely imaged on this examination.    CT ANGIOGRAPHY BRAIN:    Internal carotid arteries: Patent bilaterally. No flow limiting stenosis.    Anterior cerebral arteries: Patent bilaterally without flow limiting   stenosis.    Middle cerebral arteries: Patent bilaterally without flow limiting    stenosis.    Anterior communicating artery: Poorly visualized.    Posterior communicating arteries: Visualized bilaterally.    Posterior cerebral arteries: Patent bilaterally without stenosis. Right   fetal origin.    Vertebrobasilar: Patent without stenosis. The distal vertebral arteries   are similar in caliber.    Vascular lesions: No evidence of intracranial aneurysm within limits of   CTA technique.  Tiny aneurysms may be beyond the resolution of this   examination.    Dural venous sinuses: Grossly patent.    IMPRESSION:    CTA Neck: No significant flow-limiting stenosis or evidence of acute   dissection within the cervical carotid or vertebral arteries. No specific   short segment beaded appearance to the mid left cervical internal carotid   artery for which focal fibromuscular dysplasia is not excluded.    Partially imaged 2.6 cm right paratracheal peripherally calcified lesion.   Dedicated nonemergent chest imaging may be obtained asclinically   warranted.    CTA Head: No proximal large vessel occlusion.    CT Perfusion: No perfusion abnormality.    --- End of Report ---            TU CONNER MD; Attending Radiologist  This document has been electronically signed. Dec 13 2021  9:43PM    < end of copied text >  < from: CT Head No Cont (21 @ 22:34) >    ACC: [id number] EXAM:  CT BRAIN                          PROCEDURE DATE:  2021          INTERPRETATION:  HEAD CT    INDICATIONS: 89F  Afib, Left facial droop, generalized weakness and   confusion, on xarelto, HTN, HLD, s/p AICD, [place] p/w L facial droop,  after   mechanical fall earlier; a/f stroke work-up, hyponatremia  code stroke, follow-up    TECHNIQUE:    HEAD CT:    Serial axial images were obtained from the skull base to the vertex   without the use of intravenous contrast and reconstructed in sagittal and   coronal planes    COMPARISON EXAMINATION: Head CT, CT perfusion, CTA, 2021    FINDINGS:    VENTRICLES AND SULCI: Redemonstration moderate-to-marked enlargement of   the ventricles and sulci consistent with volume loss with posterior fossa   arachnoid cyst and denise cisterna magna.    INTRA-AXIAL: Unchanged white matter decreased attenuation, likely   microvascular disease with age indeterminate lacunar infarcts, as well as   pontine decreased attenuation that may reflect microvascular disease,   altered sodium metabolism. Unchanged partially empty sella. No new   intracranial mass, acute hemorrhage, or midline shift, carotid siphon   calcification, basal cisterns are patent.  EXTRA-AXIAL: No extra-axial fluid collection is present.  INTRACRANIAL HEMORRHAGE: No new intracranial hemorrhage    VISUALIZED SINUSES: No air-fluid levels are identified minimal, sinus   mucosal thickening.    MASTOIDS: Mastoid tip slight sclerosis opacification, degenerative   temporomandibular joints CALVARIUM: Demonstration right frontoparietal   scalp soft tissue swelling and scalp staples without underlying fracture.   Degenerative craniocervical junction.    MISCELLANEOUS:  Absent orbital lenses with cataract surgery    IMPRESSION:    Redemonstration volume loss, partially empty sella, microvascular   disease, age indeterminate lacunar infarcts, no new hemorrhage or midline   shift. Right frontoparietal scalp soft tissue swelling and scalp staples.   If symptoms persist consider follow-up head CT or MR if no   contraindication.          --- End of Report ---            JUVENTINO NORWOOD MD; Attending Radiologist  This document has been electronically signed. Dec 15 2021  8:39AM    < end of copied text >

## 2021-12-16 NOTE — DISCHARGE NOTE PROVIDER - NSDCCPCAREPLAN_GEN_ALL_CORE_FT
PRINCIPAL DISCHARGE DIAGNOSIS  Diagnosis: Hypotension  Assessment and Plan of Treatment: Your blood pressure medications (Ramipril, Carvedilol, and Lasix) are currently being held because your blood pressure was too low and caused you to syncopize. Please make sure to follow up with your doctor within the next few days to follow up on your current medicaion regimen.   Continue with Amiodarone one tablet once a day.   If you develop chest pain, SOB, dizziness return to the ED      SECONDARY DISCHARGE DIAGNOSES  Diagnosis: Scalp laceration  Assessment and Plan of Treatment: You have staples placed for your scalp laceration, Please go to the ED fast track on Friday, Dec 24 to have them removed. You do not have to keep it covered and you may shower, make sure to be gentle while showering.    Diagnosis: Acute cerebrovascular accident (CVA)  Assessment and Plan of Treatment: You presented after syncope and fall, stroke was ruled out with CT of the head.    Diagnosis: Paroxysmal atrial fibrillation  Assessment and Plan of Treatment: Your AICD was interrrogated during this admission  Stable lead impedance  - Review of stored data revealed  mode switch episodes for AFib  - changes made: atrial sensitivity reduced from 0.2 to to 0.1mv, and max track/ PMT rate reduced to 110bpm    Diagnosis: Facial droop  Assessment and Plan of Treatment:

## 2021-12-16 NOTE — DISCHARGE NOTE NURSING/CASE MANAGEMENT/SOCIAL WORK - PATIENT PORTAL LINK FT
You can access the FollowMyHealth Patient Portal offered by [facility] by registering at the following website: [email or web address]. By joining [facility]’s FollowMyHealth portal, you will also be able to view your health information using other applications (apps) compatible with our system.

## 2021-12-16 NOTE — DISCHARGE NOTE PROVIDER - NSDCMRMEDTOKEN_GEN_ALL_CORE_FT
acetaminophen 325 mg oral tablet: 2 tab(s) orally every 6 hours, As needed, Temp greater or equal to 38C (100.4F), Mild Pain (1 - 3)  amiodarone 200 mg oral tablet: 1 tab(s) orally once a day  folic acid 1 mg oral tablet: 1 tab(s) orally once a day  pravastatin 40 mg oral tablet: 1 tab(s) orally once a day  Xarelto 15 mg oral tablet: 1 tab(s) orally once a day (in the evening)   acetaminophen 325 mg oral tablet: 2 tab(s) orally every 6 hours, As needed, Temp greater or equal to 38C (100.4F), Mild Pain (1 - 3)  amiodarone 200 mg oral tablet: 1 tab(s) orally once a day  folic acid 1 mg oral tablet: 1 tab(s) orally once a day  pravastatin 40 mg oral tablet: 1 tab(s) orally once a day  Rolling walker:   Xarelto 15 mg oral tablet: 1 tab(s) orally once a day (in the evening)   acetaminophen 325 mg oral tablet: 2 tab(s) orally every 6 hours, As needed, Temp greater or equal to 38C (100.4F), Mild Pain (1 - 3)  amiodarone 200 mg oral tablet: 1 tab(s) orally once a day  folic acid 1 mg oral tablet: 1 tab(s) orally once a day  pravastatin 40 mg oral tablet: 1 tab(s) orally once a day  Rolling walker:   Rolling walker:   rolling walker:   Xarelto 15 mg oral tablet: 1 tab(s) orally once a day (in the evening)

## 2021-12-16 NOTE — PROGRESS NOTE ADULT - PROBLEM SELECTOR PLAN 2
pt hypotensive on arrival 60/30 --> now improved to 90/50s  pt on multiple antiHTN at home including coreg, chlorthalidone, ramipril, ?lasix, all stopped   c/w IVF, holding all home antiHTN  q4h vitals for now, tele is paced

## 2021-12-30 PROCEDURE — 0042T: CPT

## 2021-12-30 PROCEDURE — 70498 CT ANGIOGRAPHY NECK: CPT | Mod: MA

## 2021-12-30 PROCEDURE — U0003: CPT

## 2021-12-30 PROCEDURE — 82330 ASSAY OF CALCIUM: CPT

## 2021-12-30 PROCEDURE — 84443 ASSAY THYROID STIM HORMONE: CPT

## 2021-12-30 PROCEDURE — 80053 COMPREHEN METABOLIC PANEL: CPT

## 2021-12-30 PROCEDURE — 97110 THERAPEUTIC EXERCISES: CPT

## 2021-12-30 PROCEDURE — 85027 COMPLETE CBC AUTOMATED: CPT

## 2021-12-30 PROCEDURE — 81001 URINALYSIS AUTO W/SCOPE: CPT

## 2021-12-30 PROCEDURE — 84132 ASSAY OF SERUM POTASSIUM: CPT

## 2021-12-30 PROCEDURE — 83930 ASSAY OF BLOOD OSMOLALITY: CPT

## 2021-12-30 PROCEDURE — 93005 ELECTROCARDIOGRAM TRACING: CPT

## 2021-12-30 PROCEDURE — 84484 ASSAY OF TROPONIN QUANT: CPT

## 2021-12-30 PROCEDURE — 97162 PT EVAL MOD COMPLEX 30 MIN: CPT

## 2021-12-30 PROCEDURE — 83735 ASSAY OF MAGNESIUM: CPT

## 2021-12-30 PROCEDURE — 82947 ASSAY GLUCOSE BLOOD QUANT: CPT

## 2021-12-30 PROCEDURE — 82435 ASSAY OF BLOOD CHLORIDE: CPT

## 2021-12-30 PROCEDURE — 36415 COLL VENOUS BLD VENIPUNCTURE: CPT

## 2021-12-30 PROCEDURE — 80048 BASIC METABOLIC PNL TOTAL CA: CPT

## 2021-12-30 PROCEDURE — 83605 ASSAY OF LACTIC ACID: CPT

## 2021-12-30 PROCEDURE — 82570 ASSAY OF URINE CREATININE: CPT

## 2021-12-30 PROCEDURE — 85025 COMPLETE CBC W/AUTO DIFF WBC: CPT

## 2021-12-30 PROCEDURE — 70450 CT HEAD/BRAIN W/O DYE: CPT

## 2021-12-30 PROCEDURE — 83036 HEMOGLOBIN GLYCOSYLATED A1C: CPT

## 2021-12-30 PROCEDURE — 85018 HEMOGLOBIN: CPT

## 2021-12-30 PROCEDURE — 87086 URINE CULTURE/COLONY COUNT: CPT

## 2021-12-30 PROCEDURE — 85730 THROMBOPLASTIN TIME PARTIAL: CPT

## 2021-12-30 PROCEDURE — 82962 GLUCOSE BLOOD TEST: CPT

## 2021-12-30 PROCEDURE — 80061 LIPID PANEL: CPT

## 2021-12-30 PROCEDURE — 97116 GAIT TRAINING THERAPY: CPT

## 2021-12-30 PROCEDURE — 85610 PROTHROMBIN TIME: CPT

## 2021-12-30 PROCEDURE — 83935 ASSAY OF URINE OSMOLALITY: CPT

## 2021-12-30 PROCEDURE — 84300 ASSAY OF URINE SODIUM: CPT

## 2021-12-30 PROCEDURE — 84100 ASSAY OF PHOSPHORUS: CPT

## 2021-12-30 PROCEDURE — 99291 CRITICAL CARE FIRST HOUR: CPT

## 2021-12-30 PROCEDURE — 84295 ASSAY OF SERUM SODIUM: CPT

## 2021-12-30 PROCEDURE — 82803 BLOOD GASES ANY COMBINATION: CPT

## 2021-12-30 PROCEDURE — 71045 X-RAY EXAM CHEST 1 VIEW: CPT

## 2021-12-30 PROCEDURE — 85014 HEMATOCRIT: CPT

## 2021-12-30 PROCEDURE — 70496 CT ANGIOGRAPHY HEAD: CPT | Mod: MA

## 2021-12-30 PROCEDURE — U0005: CPT

## 2022-08-26 NOTE — ED PROVIDER NOTE - CADM POA PRESS ULCER
Patient verbalized understanding of all DC instructions, all needs met a time of DC  Patient states she is feeling much better and is ready to go home        Timothy Villanueva RN  08/26/22 [id number] No

## 2023-03-15 NOTE — ED ADULT TRIAGE NOTE - INTERNATIONAL TRAVEL
Determine if MRI is needed at appointment with Dr. Crespo. After appointment, please contact Dr. Valle's office for follow up @ [phone]    Lead extraction will take place at [facility] in [place]. Anticiapte 1 night stay    Will need to hold Warfarin prior to surgery without lovenox bridge.   
No
yes